# Patient Record
Sex: MALE | Race: WHITE | Employment: OTHER | URBAN - METROPOLITAN AREA
[De-identification: names, ages, dates, MRNs, and addresses within clinical notes are randomized per-mention and may not be internally consistent; named-entity substitution may affect disease eponyms.]

---

## 2019-12-26 ENCOUNTER — HOSPITAL ENCOUNTER (INPATIENT)
Age: 69
LOS: 5 days | Discharge: HOME | DRG: 872 | End: 2019-12-31
Attending: EMERGENCY MEDICINE | Admitting: INTERNAL MEDICINE
Payer: MEDICARE

## 2019-12-26 ENCOUNTER — APPOINTMENT (OUTPATIENT)
Dept: GENERAL RADIOLOGY | Age: 69
DRG: 872 | End: 2019-12-26
Payer: MEDICARE

## 2019-12-26 PROBLEM — A41.9 SEPSIS (HCC): Status: ACTIVE | Noted: 2019-12-26

## 2019-12-26 LAB
ALBUMIN SERPL-MCNC: 3.6 G/DL (ref 3.5–5.2)
ALP BLD-CCNC: 143 U/L (ref 40–129)
ALT SERPL-CCNC: 19 U/L (ref 0–40)
ANION GAP SERPL CALCULATED.3IONS-SCNC: 19 MMOL/L (ref 7–16)
AST SERPL-CCNC: 25 U/L (ref 0–39)
BACTERIA: ABNORMAL /HPF
BASOPHILS ABSOLUTE: 0 E9/L (ref 0–0.2)
BASOPHILS RELATIVE PERCENT: 0.1 % (ref 0–2)
BILIRUB SERPL-MCNC: 1.7 MG/DL (ref 0–1.2)
BILIRUBIN URINE: NEGATIVE
BLOOD, URINE: ABNORMAL
BUN BLDV-MCNC: 26 MG/DL (ref 8–23)
BURR CELLS: ABNORMAL
CALCIUM SERPL-MCNC: 9 MG/DL (ref 8.6–10.2)
CHLORIDE BLD-SCNC: 98 MMOL/L (ref 98–107)
CLARITY: ABNORMAL
CO2: 19 MMOL/L (ref 22–29)
COLOR: YELLOW
CREAT SERPL-MCNC: 1.8 MG/DL (ref 0.7–1.2)
EOSINOPHILS ABSOLUTE: 0 E9/L (ref 0.05–0.5)
EOSINOPHILS RELATIVE PERCENT: 0 % (ref 0–6)
GFR AFRICAN AMERICAN: 45
GFR NON-AFRICAN AMERICAN: 37 ML/MIN/1.73
GLUCOSE BLD-MCNC: 122 MG/DL (ref 74–99)
GLUCOSE URINE: NEGATIVE MG/DL
HCT VFR BLD CALC: 50.9 % (ref 37–54)
HEMOGLOBIN: 16.7 G/DL (ref 12.5–16.5)
INFLUENZA A BY PCR: NOT DETECTED
INFLUENZA B BY PCR: NOT DETECTED
KETONES, URINE: ABNORMAL MG/DL
LACTIC ACID: 4.4 MMOL/L (ref 0.5–2.2)
LEUKOCYTE ESTERASE, URINE: ABNORMAL
LYMPHOCYTES ABSOLUTE: 0.12 E9/L (ref 1.5–4)
LYMPHOCYTES RELATIVE PERCENT: 0.9 % (ref 20–42)
MAGNESIUM: 1.5 MG/DL (ref 1.6–2.6)
MCH RBC QN AUTO: 28.9 PG (ref 26–35)
MCHC RBC AUTO-ENTMCNC: 32.8 % (ref 32–34.5)
MCV RBC AUTO: 88.1 FL (ref 80–99.9)
MONOCYTES ABSOLUTE: 0.12 E9/L (ref 0.1–0.95)
MONOCYTES RELATIVE PERCENT: 0.9 % (ref 2–12)
NEUTROPHILS ABSOLUTE: 11.76 E9/L (ref 1.8–7.3)
NEUTROPHILS RELATIVE PERCENT: 98.3 % (ref 43–80)
NITRITE, URINE: POSITIVE
NUCLEATED RED BLOOD CELLS: 0.9 /100 WBC
PDW BLD-RTO: 13.5 FL (ref 11.5–15)
PH UA: 5.5 (ref 5–9)
PLATELET # BLD: 156 E9/L (ref 130–450)
PMV BLD AUTO: 11.4 FL (ref 7–12)
POIKILOCYTES: ABNORMAL
POTASSIUM REFLEX MAGNESIUM: 3.4 MMOL/L (ref 3.5–5)
PROTEIN UA: 100 MG/DL
RBC # BLD: 5.78 E12/L (ref 3.8–5.8)
RBC UA: ABNORMAL /HPF (ref 0–2)
SODIUM BLD-SCNC: 136 MMOL/L (ref 132–146)
SPECIFIC GRAVITY UA: >=1.03 (ref 1–1.03)
TOTAL PROTEIN: 7.3 G/DL (ref 6.4–8.3)
TROPONIN: 0.04 NG/ML (ref 0–0.03)
UROBILINOGEN, URINE: 1 E.U./DL
WBC # BLD: 12 E9/L (ref 4.5–11.5)
WBC UA: ABNORMAL /HPF (ref 0–5)

## 2019-12-26 PROCEDURE — 6370000000 HC RX 637 (ALT 250 FOR IP): Performed by: STUDENT IN AN ORGANIZED HEALTH CARE EDUCATION/TRAINING PROGRAM

## 2019-12-26 PROCEDURE — 87150 DNA/RNA AMPLIFIED PROBE: CPT

## 2019-12-26 PROCEDURE — 93005 ELECTROCARDIOGRAM TRACING: CPT | Performed by: STUDENT IN AN ORGANIZED HEALTH CARE EDUCATION/TRAINING PROGRAM

## 2019-12-26 PROCEDURE — 87088 URINE BACTERIA CULTURE: CPT

## 2019-12-26 PROCEDURE — 84484 ASSAY OF TROPONIN QUANT: CPT

## 2019-12-26 PROCEDURE — 83605 ASSAY OF LACTIC ACID: CPT

## 2019-12-26 PROCEDURE — 83735 ASSAY OF MAGNESIUM: CPT

## 2019-12-26 PROCEDURE — 2060000000 HC ICU INTERMEDIATE R&B

## 2019-12-26 PROCEDURE — 36415 COLL VENOUS BLD VENIPUNCTURE: CPT

## 2019-12-26 PROCEDURE — 87186 SC STD MICRODIL/AGAR DIL: CPT

## 2019-12-26 PROCEDURE — 99285 EMERGENCY DEPT VISIT HI MDM: CPT

## 2019-12-26 PROCEDURE — 80053 COMPREHEN METABOLIC PANEL: CPT

## 2019-12-26 PROCEDURE — 71045 X-RAY EXAM CHEST 1 VIEW: CPT

## 2019-12-26 PROCEDURE — 81001 URINALYSIS AUTO W/SCOPE: CPT

## 2019-12-26 PROCEDURE — 87077 CULTURE AEROBIC IDENTIFY: CPT

## 2019-12-26 PROCEDURE — 87040 BLOOD CULTURE FOR BACTERIA: CPT

## 2019-12-26 PROCEDURE — 85025 COMPLETE CBC W/AUTO DIFF WBC: CPT

## 2019-12-26 PROCEDURE — 87502 INFLUENZA DNA AMP PROBE: CPT

## 2019-12-26 PROCEDURE — 2580000003 HC RX 258: Performed by: STUDENT IN AN ORGANIZED HEALTH CARE EDUCATION/TRAINING PROGRAM

## 2019-12-26 RX ORDER — MAGNESIUM SULFATE 1 G/100ML
1 INJECTION INTRAVENOUS ONCE
Status: COMPLETED | OUTPATIENT
Start: 2019-12-27 | End: 2019-12-27

## 2019-12-26 RX ORDER — 0.9 % SODIUM CHLORIDE 0.9 %
30 INTRAVENOUS SOLUTION INTRAVENOUS ONCE
Status: DISCONTINUED | OUTPATIENT
Start: 2019-12-26 | End: 2019-12-26

## 2019-12-26 RX ORDER — POTASSIUM CHLORIDE 20 MEQ/1
40 TABLET, EXTENDED RELEASE ORAL ONCE
Status: COMPLETED | OUTPATIENT
Start: 2019-12-26 | End: 2019-12-27

## 2019-12-26 RX ORDER — SODIUM CHLORIDE, SODIUM LACTATE, POTASSIUM CHLORIDE, AND CALCIUM CHLORIDE .6; .31; .03; .02 G/100ML; G/100ML; G/100ML; G/100ML
30 INJECTION, SOLUTION INTRAVENOUS ONCE
Status: DISCONTINUED | OUTPATIENT
Start: 2019-12-26 | End: 2019-12-26

## 2019-12-26 RX ORDER — 0.9 % SODIUM CHLORIDE 0.9 %
30 INTRAVENOUS SOLUTION INTRAVENOUS ONCE
Status: COMPLETED | OUTPATIENT
Start: 2019-12-26 | End: 2019-12-27

## 2019-12-26 RX ORDER — MAGNESIUM SULFATE HEPTAHYDRATE 500 MG/ML
1 INJECTION, SOLUTION INTRAMUSCULAR; INTRAVENOUS ONCE
Status: DISCONTINUED | OUTPATIENT
Start: 2019-12-26 | End: 2019-12-26 | Stop reason: CLARIF

## 2019-12-26 RX ORDER — ACETAMINOPHEN 325 MG/1
650 TABLET ORAL ONCE
Status: COMPLETED | OUTPATIENT
Start: 2019-12-26 | End: 2019-12-26

## 2019-12-26 RX ADMIN — SODIUM CHLORIDE 3078 ML: 9 INJECTION, SOLUTION INTRAVENOUS at 21:58

## 2019-12-26 RX ADMIN — ACETAMINOPHEN 650 MG: 325 TABLET, FILM COATED ORAL at 22:06

## 2019-12-26 ASSESSMENT — ENCOUNTER SYMPTOMS
ABDOMINAL DISTENTION: 0
RECTAL PAIN: 0
CONSTIPATION: 0
TROUBLE SWALLOWING: 0
ALLERGIC/IMMUNOLOGIC NEGATIVE: 1
STRIDOR: 0
PHOTOPHOBIA: 0
CHEST TIGHTNESS: 0
NAUSEA: 0
SHORTNESS OF BREATH: 1
DIARRHEA: 0
VOMITING: 0
COUGH: 0
COLOR CHANGE: 0
WHEEZING: 0
CHOKING: 0
VOICE CHANGE: 0

## 2019-12-26 ASSESSMENT — PAIN SCALES - GENERAL: PAINLEVEL_OUTOF10: 0

## 2019-12-27 ENCOUNTER — APPOINTMENT (OUTPATIENT)
Dept: ULTRASOUND IMAGING | Age: 69
DRG: 872 | End: 2019-12-27
Payer: MEDICARE

## 2019-12-27 LAB
ACINETOBACTER BAUMANNII BY PCR: NOT DETECTED
ALBUMIN SERPL-MCNC: 2.8 G/DL (ref 3.5–5.2)
ALP BLD-CCNC: 65 U/L (ref 40–129)
ALT SERPL-CCNC: 22 U/L (ref 0–40)
ANION GAP SERPL CALCULATED.3IONS-SCNC: 14 MMOL/L (ref 7–16)
AST SERPL-CCNC: 38 U/L (ref 0–39)
BILIRUB SERPL-MCNC: 1.1 MG/DL (ref 0–1.2)
BUN BLDV-MCNC: 28 MG/DL (ref 8–23)
CALCIUM SERPL-MCNC: 7.9 MG/DL (ref 8.6–10.2)
CANDIDA ALBICANS BY PCR: NOT DETECTED
CANDIDA GLABRATA BY PCR: NOT DETECTED
CANDIDA KRUSEI BY PCR: NOT DETECTED
CANDIDA PARAPSILOSIS BY PCR: NOT DETECTED
CANDIDA TROPICALIS BY PCR: NOT DETECTED
CARBAPENEM RESISTANCE KPC BY PCR: NOT DETECTED
CHLORIDE BLD-SCNC: 104 MMOL/L (ref 98–107)
CO2: 18 MMOL/L (ref 22–29)
CREAT SERPL-MCNC: 1.7 MG/DL (ref 0.7–1.2)
EKG ATRIAL RATE: 130 BPM
EKG P AXIS: 49 DEGREES
EKG P-R INTERVAL: 148 MS
EKG Q-T INTERVAL: 302 MS
EKG QRS DURATION: 88 MS
EKG QTC CALCULATION (BAZETT): 444 MS
EKG R AXIS: -48 DEGREES
EKG T AXIS: 71 DEGREES
EKG VENTRICULAR RATE: 130 BPM
ENTEROBACTER CLOACAE COMPLEX BY PCR: NOT DETECTED
ENTEROBACTERALES BY PCR: DETECTED
ENTEROCOCCUS BY PCR: NOT DETECTED
ESCHERICHIA COLI BY PCR: DETECTED
GFR AFRICAN AMERICAN: 48
GFR NON-AFRICAN AMERICAN: 40 ML/MIN/1.73
GLUCOSE BLD-MCNC: 127 MG/DL (ref 74–99)
HAEMOPHILUS INFLUENZAE BY PCR: NOT DETECTED
HCT VFR BLD CALC: 44 % (ref 37–54)
HEMOGLOBIN: 14.6 G/DL (ref 12.5–16.5)
KLEBSIELLA OXYTOCA BY PCR: NOT DETECTED
KLEBSIELLA PNEUMONIAE GROUP BY PCR: NOT DETECTED
LACTIC ACID: 2 MMOL/L (ref 0.5–2.2)
LACTIC ACID: 3 MMOL/L (ref 0.5–2.2)
LISTERIA MONOCYTOGENES BY PCR: NOT DETECTED
MAGNESIUM: 1.8 MG/DL (ref 1.6–2.6)
MCH RBC QN AUTO: 29.6 PG (ref 26–35)
MCHC RBC AUTO-ENTMCNC: 33.2 % (ref 32–34.5)
MCV RBC AUTO: 89.2 FL (ref 80–99.9)
NEISSERIA MENINGITIDIS BY PCR: NOT DETECTED
PDW BLD-RTO: 13.7 FL (ref 11.5–15)
PLATELET # BLD: 133 E9/L (ref 130–450)
PMV BLD AUTO: 11.9 FL (ref 7–12)
POTASSIUM REFLEX MAGNESIUM: 3.5 MMOL/L (ref 3.5–5)
PROCALCITONIN: 29.32 NG/ML (ref 0–0.08)
PROTEUS BY PCR: NOT DETECTED
PSEUDOMONAS AERUGINOSA BY PCR: NOT DETECTED
RBC # BLD: 4.93 E12/L (ref 3.8–5.8)
SERRATIA MARCESCENS BY PCR: NOT DETECTED
SODIUM BLD-SCNC: 136 MMOL/L (ref 132–146)
STAPHYLOCOCCUS AUREUS BY PCR: NOT DETECTED
STAPHYLOCOCCUS SPECIES BY PCR: NOT DETECTED
STREPTOCOCCUS AGALACTIAE BY PCR: NOT DETECTED
STREPTOCOCCUS PNEUMONIAE BY PCR: NOT DETECTED
STREPTOCOCCUS PYOGENES  BY PCR: NOT DETECTED
STREPTOCOCCUS SPECIES BY PCR: NOT DETECTED
TOTAL PROTEIN: 5.4 G/DL (ref 6.4–8.3)
WBC # BLD: 30.5 E9/L (ref 4.5–11.5)

## 2019-12-27 PROCEDURE — 2060000000 HC ICU INTERMEDIATE R&B

## 2019-12-27 PROCEDURE — 6360000002 HC RX W HCPCS: Performed by: INTERNAL MEDICINE

## 2019-12-27 PROCEDURE — 80053 COMPREHEN METABOLIC PANEL: CPT

## 2019-12-27 PROCEDURE — 2580000003 HC RX 258: Performed by: STUDENT IN AN ORGANIZED HEALTH CARE EDUCATION/TRAINING PROGRAM

## 2019-12-27 PROCEDURE — 6370000000 HC RX 637 (ALT 250 FOR IP): Performed by: EMERGENCY MEDICINE

## 2019-12-27 PROCEDURE — 85027 COMPLETE CBC AUTOMATED: CPT

## 2019-12-27 PROCEDURE — 84145 PROCALCITONIN (PCT): CPT

## 2019-12-27 PROCEDURE — 36415 COLL VENOUS BLD VENIPUNCTURE: CPT

## 2019-12-27 PROCEDURE — 6360000002 HC RX W HCPCS: Performed by: STUDENT IN AN ORGANIZED HEALTH CARE EDUCATION/TRAINING PROGRAM

## 2019-12-27 PROCEDURE — 6360000002 HC RX W HCPCS: Performed by: EMERGENCY MEDICINE

## 2019-12-27 PROCEDURE — 93010 ELECTROCARDIOGRAM REPORT: CPT | Performed by: INTERNAL MEDICINE

## 2019-12-27 PROCEDURE — 6370000000 HC RX 637 (ALT 250 FOR IP): Performed by: INTERNAL MEDICINE

## 2019-12-27 PROCEDURE — 2580000003 HC RX 258: Performed by: INTERNAL MEDICINE

## 2019-12-27 PROCEDURE — 76770 US EXAM ABDO BACK WALL COMP: CPT

## 2019-12-27 PROCEDURE — 83735 ASSAY OF MAGNESIUM: CPT

## 2019-12-27 PROCEDURE — 83605 ASSAY OF LACTIC ACID: CPT

## 2019-12-27 RX ORDER — POTASSIUM CHLORIDE 750 MG/1
10 TABLET, EXTENDED RELEASE ORAL DAILY
Status: DISCONTINUED | OUTPATIENT
Start: 2019-12-27 | End: 2019-12-29

## 2019-12-27 RX ORDER — SODIUM CHLORIDE 9 MG/ML
INJECTION, SOLUTION INTRAVENOUS CONTINUOUS
Status: DISCONTINUED | OUTPATIENT
Start: 2019-12-27 | End: 2019-12-29

## 2019-12-27 RX ORDER — ERGOCALCIFEROL 1.25 MG/1
50000 CAPSULE ORAL WEEKLY
COMMUNITY
End: 2022-10-24 | Stop reason: SDUPTHER

## 2019-12-27 RX ORDER — ONDANSETRON 2 MG/ML
4 INJECTION INTRAMUSCULAR; INTRAVENOUS EVERY 6 HOURS PRN
Status: DISCONTINUED | OUTPATIENT
Start: 2019-12-27 | End: 2019-12-31 | Stop reason: HOSPADM

## 2019-12-27 RX ORDER — HEPARIN SODIUM 10000 [USP'U]/ML
5000 INJECTION, SOLUTION INTRAVENOUS; SUBCUTANEOUS EVERY 8 HOURS
Status: DISCONTINUED | OUTPATIENT
Start: 2019-12-27 | End: 2019-12-31 | Stop reason: HOSPADM

## 2019-12-27 RX ORDER — ACETAMINOPHEN 325 MG/1
650 TABLET ORAL EVERY 4 HOURS PRN
Status: DISCONTINUED | OUTPATIENT
Start: 2019-12-27 | End: 2019-12-31 | Stop reason: HOSPADM

## 2019-12-27 RX ORDER — MELOXICAM 7.5 MG/1
7.5 TABLET ORAL DAILY
Status: ON HOLD | COMMUNITY
End: 2019-12-30 | Stop reason: HOSPADM

## 2019-12-27 RX ADMIN — MAGNESIUM SULFATE 1 G: 1 INJECTION INTRAVENOUS at 01:57

## 2019-12-27 RX ADMIN — SODIUM CHLORIDE: 9 INJECTION, SOLUTION INTRAVENOUS at 01:57

## 2019-12-27 RX ADMIN — POTASSIUM CHLORIDE 40 MEQ: 1500 TABLET, EXTENDED RELEASE ORAL at 00:28

## 2019-12-27 RX ADMIN — HEPARIN SODIUM 5000 UNITS: 10000 INJECTION INTRAVENOUS; SUBCUTANEOUS at 14:36

## 2019-12-27 RX ADMIN — CEFEPIME HYDROCHLORIDE 2 G: 2 INJECTION, POWDER, FOR SOLUTION INTRAVENOUS at 14:36

## 2019-12-27 RX ADMIN — SODIUM CHLORIDE: 9 INJECTION, SOLUTION INTRAVENOUS at 21:55

## 2019-12-27 RX ADMIN — ACETAMINOPHEN 650 MG: 325 TABLET, FILM COATED ORAL at 18:14

## 2019-12-27 RX ADMIN — HEPARIN SODIUM 5000 UNITS: 10000 INJECTION INTRAVENOUS; SUBCUTANEOUS at 06:48

## 2019-12-27 RX ADMIN — HEPARIN SODIUM 5000 UNITS: 10000 INJECTION INTRAVENOUS; SUBCUTANEOUS at 21:37

## 2019-12-27 RX ADMIN — POTASSIUM CHLORIDE 10 MEQ: 750 TABLET, EXTENDED RELEASE ORAL at 07:55

## 2019-12-27 RX ADMIN — CEFTRIAXONE SODIUM 1 G: 1 INJECTION, POWDER, FOR SOLUTION INTRAMUSCULAR; INTRAVENOUS at 00:31

## 2019-12-27 ASSESSMENT — PAIN SCALES - GENERAL
PAINLEVEL_OUTOF10: 0
PAINLEVEL_OUTOF10: 0
PAINLEVEL_OUTOF10: 4
PAINLEVEL_OUTOF10: 0

## 2019-12-27 NOTE — PROGRESS NOTES
Hospitalist Progress Note      PCP: No primary care provider on file. Date of Admission: 12/26/2019    Chief Complaint: vomiting [facility] Course: 22-year-old male with fever and vomiting for a few days, has mild renal impairment, which seems to be chronic. Urine showed leukocytes. White blood count shot up to 30,000 today. Patient said that he feels much better, no nausea, and no abdominal pain. Subjective: Patient denies abdominal pain or nausea. Medications:  Reviewed    Infusion Medications    sodium chloride 100 mL/hr at 12/27/19 0157     Scheduled Medications    cefTRIAXone (ROCEPHIN) IV  1 g Intravenous Q24H    heparin (porcine)  5,000 Units Subcutaneous Q8H    potassium chloride  10 mEq Oral Daily     PRN Meds: acetaminophen, ondansetron      Intake/Output Summary (Last 24 hours) at 12/27/2019 1055  Last data filed at 12/27/2019 0533  Gross per 24 hour   Intake 670 ml   Output 0 ml   Net 670 ml       Exam:    /76   Pulse 103   Temp 99.1 °F (37.3 °C) (Temporal)   Resp 24   Ht 5' 10\" (1.778 m)   Wt 226 lb 3.2 oz (102.6 kg)   SpO2 94%   BMI 32.46 kg/m²     General appearance: No apparent distress, appears stated age and cooperative. HEENT: Pupils equal, round, and reactive to light. Conjunctivae/corneas clear. Neck: Supple, with full range of motion. No jugular venous distention. Trachea midline. Respiratory:  Normal respiratory effort. Clear to auscultation, bilaterally without Rales/Wheezes/Rhonchi. Cardiovascular: Regular rate and rhythm with normal S1/S2 without murmurs, rubs or gallops. Abdomen: Soft, non-tender, non-distended with normal bowel sounds. Musculoskeletal: No clubbing, cyanosis or edema bilaterally. Full range of motion without deformity. Skin: Skin color, texture, turgor normal.  No rashes or lesions. Neurologic:  Neurovascularly intact without any focal sensory/motor deficits.  Cranial nerves: II-XII intact, grossly

## 2019-12-27 NOTE — PROGRESS NOTES
Dr. Almendarez Early notified of consult via perfect serve.  Electronically signed by Chandu Pizano RN on 12/27/2019 at 8:53 AM

## 2019-12-27 NOTE — ED PROVIDER NOTES
75-year-old male no past medical history presents with a fever. Patient states that he has felt not like himself for the last 2 days. He attributes it to constipation and abdominal pain. States he has taken prune juice and had a bowl movement as of yesterday and feels much better. Although his abdominal pain has resolved patient has a fever. Patient admits to one bout of nonbloody vomitus today. Patient's reported temperature at home is 102.0. Patient has no other complaints. Denies chills, cough, sob, chest pain, abdominal pain, flank pain, dysuria, hematuria, diarrhea and constipation. Review of Systems   Constitutional: Positive for fever. Negative for activity change, chills, diaphoresis, fatigue and unexpected weight change. HENT: Negative for congestion, trouble swallowing and voice change. Eyes: Negative for photophobia and visual disturbance. Respiratory: Positive for shortness of breath. Negative for cough, choking, chest tightness, wheezing and stridor. Mildly short of breath   Cardiovascular: Negative. Negative for chest pain, palpitations and leg swelling. Gastrointestinal: Negative for abdominal distention, constipation, diarrhea, nausea, rectal pain and vomiting. Endocrine: Negative for polyphagia and polyuria. Genitourinary: Negative for dysuria and hematuria. Musculoskeletal: Negative. Skin: Positive for rash. Negative for color change, pallor and wound. Right scrotal rash   Allergic/Immunologic: Negative. Neurological: Negative. Negative for dizziness, tremors and weakness. Hematological: Negative. Psychiatric/Behavioral: Negative. Negative for agitation, sleep disturbance and suicidal ideas. Physical Exam  Constitutional:       General: He is not in acute distress. Appearance: Normal appearance. He is not ill-appearing or diaphoretic. HENT:      Head: Normocephalic and atraumatic.       Right Ear: Tympanic membrane normal. E9/L    Monocytes Absolute 0.12 0.10 - 0.95 E9/L    Eosinophils Absolute 0.00 (L) 0.05 - 0.50 E9/L    Basophils Absolute 0.00 0.00 - 0.20 E9/L    nRBC 0.9 /100 WBC    Poikilocytes 1+     [place] Cells 1+    Comprehensive Metabolic Panel w/ Reflex to MG   Result Value Ref Range    Sodium 136 132 - 146 mmol/L    Potassium reflex Magnesium 3.4 (L) 3.5 - 5.0 mmol/L    Chloride 98 98 - 107 mmol/L    CO2 19 (L) 22 - 29 mmol/L    Anion Gap 19 (H) 7 - 16 mmol/L    Glucose 122 (H) 74 - 99 mg/dL    BUN 26 (H) 8 - 23 mg/dL    CREATININE 1.8 (H) 0.7 - 1.2 mg/dL    GFR Non-African American 37 >=60 mL/min/1.73    GFR African American 45     Calcium 9.0 8.6 - 10.2 mg/dL    Total Protein 7.3 6.4 - 8.3 g/dL    Alb 3.6 3.5 - 5.2 g/dL    Total Bilirubin 1.7 (H) 0.0 - 1.2 mg/dL    Alkaline Phosphatase 143 (H) 40 - 129 U/L    ALT 19 0 - 40 U/L    AST 25 0 - 39 U/L   Troponin   Result Value Ref Range    Troponin 0.04 (H) 0.00 - 0.03 ng/mL   Lactic Acid, Plasma   Result Value Ref Range    Lactic Acid 4.4 (HH) 0.5 - 2.2 mmol/L   Lactic Acid, Plasma   Result Value Ref Range    Lactic Acid 3.0 (H) 0.5 - 2.2 mmol/L   Urinalysis, reflex to microscopic   Result Value Ref Range    Color, UA Yellow Straw/Yellow    Clarity, UA TURBID (A) Clear    Glucose, Ur Negative Negative mg/dL    Bilirubin Urine Negative Negative    Ketones, Urine TRACE (A) Negative mg/dL    Specific Gravity, UA >=1.030 1.005 - 1.030    Blood, Urine LARGE (A) Negative    pH, UA 5.5 5.0 - 9.0    Protein,  (A) Negative mg/dL    Urobilinogen, Urine 1.0 <2.0 E.U./dL    Nitrite, Urine POSITIVE (A) Negative    Leukocyte Esterase, Urine MODERATE (A) Negative   Microscopic Urinalysis   Result Value Ref Range    WBC, UA PACKED 0 - 5 /HPF    RBC, UA 2-5 0 - 2 /HPF    Bacteria, UA MANY (A) /HPF   Magnesium   Result Value Ref Range    Magnesium 1.5 (L) 1.6 - 2.6 mg/dL       RADIOLOGY:  XR CHEST PORTABLE   Final Result      Dilatation of the right hemidiaphragm with contiguous atelectasis. Slight atelectasis or scarring in the left lung base. EKG:  This EKG is signed and interpreted by me. Rate: 130  Rhythm: Sinus  Interpretation: sinus tachycardia, left axis deviation, no STEMI  Comparison: no previous EKG available      ------------------------- NURSING NOTES AND VITALS REVIEWED ---------------------------  Date / Time Roomed:  12/26/2019  9:35 PM  ED Bed Assignment:  [id number]    The nursing notes within the ED encounter and vital signs as below have been reviewed. Patient Vitals for the past 24 hrs:   BP Temp Temp src Pulse Resp SpO2 Height Weight   12/27/19 0116 111/66 99.7 °F (37.6 °C) Temporal 130 27 96 % -- --   12/27/19 0025 -- 98.1 °F (36.7 °C) -- -- -- -- -- --   12/27/19 0015 97/64 -- -- 127 (!) 42 94 % -- --   12/26/19 2257 102/60 -- -- 120 30 93 % -- --   12/26/19 2203 (!) 94/57 -- -- 122 14 92 % -- --   12/26/19 2148 -- -- -- -- -- -- -- 226 lb 3.2 oz (102.6 kg)   12/26/19 2147 112/67 103.1 °F (39.5 °C) Rectal 134 16 92 % 5' 10\" (1.778 m) 226 lb 2 oz (102.6 kg)       Oxygen Saturation Interpretation: Normal    ------------------------------------------ PROGRESS NOTES ------------------------------------------  Re-evaluation(s):  Time: 0015  Patients symptoms are improving  Repeat physical examination is improved    Counseling:  I have spoken with the patient and discussed todays results, in addition to providing specific details for the plan of care and counseling regarding the diagnosis and prognosis. Their questions are answered at this time and they are agreeable with the plan of admission.    --------------------------------- ADDITIONAL PROVIDER NOTES ---------------------------------  Consultations:  Spoke with Dr. Ulysses Skipper. Discussed case. They will admit the patient.   This patient's ED course included: a personal history and physicial examination, re-evaluation prior to disposition, multiple bedside re-evaluations, IV medications, cardiac monitoring and continuous pulse oximetry    This patient has remained hemodynamically stable during their ED course. Diagnosis:  1. Septicemia ([address]    2. Urinary tract infection without hematuria, site unspecified        Disposition:  Patient's disposition: Admit to telemetry  Patient's condition is fair.          Alejandro Denny MD  Resident  12/27/19 0959

## 2019-12-27 NOTE — ED NOTES
Blood cultures obtained from rt hand, per policy. Set two of two drawn at this time.                Isa Long RN  12/26/19 9934

## 2019-12-27 NOTE — ED NOTES
Bed: 11  Expected date:   Expected time:   Means of arrival:   Comments:  Luz Elena Gilliland RN  12/26/19 1333

## 2019-12-27 NOTE — ED NOTES
Lab called with Critical lab value of 4.4 Lactic - Dr. Richard Carlisle aware     Cyndi Tan, RN  12/26/19 1952

## 2019-12-27 NOTE — CARE COORDINATION
Spoke with patient and spouse at bedside. Patient from home, he is ambulatory with a cane. No oxygen in the home but currently wearing 3 liters. PCP is Dr. Murray Menomonie in [facility]. No history of homecare. Patient drives. Plan is home at discharge.

## 2019-12-27 NOTE — CONSULTS
Department of Internal Medicine  Infectious Diseases   Consult Note      Reason for Consult:  GNR sepsis, bacteremia       Requesting Physician:  Dr Mariajose Hooper:              This is a 68 yrs old male presented to the ER with one day hx of fever and chills , and 2 days hx of left lower abdomen pain . He reported that thew up after dinner in [place] day - then ,he developed left lower abdomen pain , which moved to the center . He denies hx of kidney stones or prostate problem , denies dysuria,or flank pain . He had temp of 103 F ,  WBC 30 K, lactic acid 4.4 , procalcitonin 29.32   Urine  Cloudy , blood cx growing GNR   He was given Ceftriaxone . Past Medical History:      Arthritis   Post polio syndrome     Past Surgical History:      Reconstructive leg surgery for polio     Current Medications:      Current Facility-Administered Medications   Medication Dose Route Frequency Provider Last Rate Last Dose    cefTRIAXone (ROCEPHIN) 1 g in sterile water 10 mL IV syringe  1 g Intravenous Q24H Donalynn Guardian, DO        acetaminophen (TYLENOL) tablet 650 mg  650 mg Oral Q4H PRN Donalynn Guardian, DO        heparin (porcine) injection 5,000 Units  5,000 Units Subcutaneous Q8H Donalynn Guardian, DO   5,000 Units at 12/27/19 3158    0.9 % sodium chloride infusion   Intravenous Continuous Donalynn Guardian,  mL/hr at 12/27/19 0157      potassium chloride (KLOR-CON M) extended release tablet 10 mEq  10 mEq Oral Daily Donalynn Guardian, DO   10 mEq at 12/27/19 0755    ondansetron (ZOFRAN) injection 4 mg  4 mg Intravenous Q6H PRN Donalynn Guardian, DO           Allergies:  Patient has no known allergies.     Social History:    Tobacco  Alcohol    Family History:     Grand mother - DM     REVIEW OF SYSTEMS:    CONSTITUTIONAL:  Fever and chills   HEENT: denies blurring of vision or double vision, denies hearing problem  RESPIRATORY: denies cough, shortness of breath, sputum expectoration, chest pain.  CARDIOVASCULAR:  Denies palpitation  GASTROINTESTINAL:  Abdomen pain, vomiting   GENITOURINARY:  Denies burning urination or frequency of urination  INTEGUMENT: denies wound , rash  HEMATOLOGIC/LYMPHATIC:  Denies lymph node swelling, gum bleeding or easy bruising. MUSCULOSKELETAL:  Denies leg pain , joint pain , joint swelling  NEUROLOGICAL:  Denies light headed, dizziness, loss of consciousness, weakness of lower extremities, bowel or bladder incontinence. PHYSICAL EXAM:      Vitals:     Vitals:    12/27/19 0755   BP: 121/76   Pulse: 103   Resp: 24   Temp: 99.1 °F (37.3 °C)   SpO2: 94%       General Appearance:    Awake, alert , no acute distress. Head:    Normocephalic, atraumatic   Eyes:    No pallor, no icterus,   Ears:    No obvious deformity or drainage.    Nose:   No nasal drainage   Throat:   Mucosa moist, no oral thrush   Neck:   Supple, no lymphadenopathy   Back:     no CVA tenderness   Lungs:     Clear to auscultation bilaterally, no wheeze    Heart:    Tachycardia    Abdomen:     Soft, non-tender, bowel sounds present    Extremities:    Edema +.non tender, no erythema    Pulses:   Dorsalis pedis palpable    Skin:   no rashes or lesions         CBC with Differential:      Lab Results   Component Value Date    WBC 30.5 12/27/2019    RBC 4.93 12/27/2019    HGB 14.6 12/27/2019    HCT 44.0 12/27/2019     12/27/2019    MCV 89.2 12/27/2019    MCH 29.6 12/27/2019    MCHC 33.2 12/27/2019    RDW 13.7 12/27/2019    NRBC 0.9 12/26/2019    LYMPHOPCT 0.9 12/26/2019    MONOPCT 0.9 12/26/2019    BASOPCT 0.1 12/26/2019    MONOSABS 0.12 12/26/2019    LYMPHSABS 0.12 12/26/2019    EOSABS 0.00 12/26/2019    BASOSABS 0.00 12/26/2019       CMP     Lab Results   Component Value Date     12/27/2019    K 3.5 12/27/2019     12/27/2019    CO2 18 12/27/2019    BUN 28 12/27/2019    CREATININE 1.7 12/27/2019    GFRAA 48 12/27/2019    LABGLOM 40 12/27/2019    GLUCOSE 127 12/27/2019    GLUCOSE 93 08/29/2011    PROT 5.4 12/27/2019    LABALBU 2.8 12/27/2019    LABALBU 4.2 08/29/2011    CALCIUM 7.9 12/27/2019    BILITOT 1.1 12/27/2019    ALKPHOS 65 12/27/2019    AST 38 12/27/2019    ALT 22 12/27/2019         Hepatic Function Panel:    Lab Results   Component Value Date    ALKPHOS 65 12/27/2019    ALT 22 12/27/2019    AST 38 12/27/2019    PROT 5.4 12/27/2019    BILITOT 1.1 12/27/2019    LABALBU 2.8 12/27/2019    LABALBU 4.2 08/29/2011       PT/INR:  No results found for: PROTIME, INR    TSH:  No results found for: TSH    U/A:    Lab Results   Component Value Date    COLORU Yellow 12/26/2019    PHUR 5.5 12/26/2019    WBCUA PACKED 12/26/2019    RBCUA 2-5 12/26/2019    BACTERIA MANY 12/26/2019    CLARITYU TURBID 12/26/2019    SPECGRAV >=1.030 12/26/2019    LEUKOCYTESUR MODERATE 12/26/2019    UROBILINOGEN 1.0 12/26/2019    BILIRUBINUR Negative 12/26/2019    BLOODU LARGE 12/26/2019    GLUCOSEU Negative 12/26/2019       ABG:  No results found for: GBF7XIT, BEART, W8TBFALH, PHART, THGBART, XTZ3HAC, PO2ART, HKB2GZQ    MICROBIOLOGY:    Blood culture - GNR     Urine Culture - pending     Radiology :    Chest X ray - no infiltrates         IMPRESSION:     1. GNR sepsis, bacteremia  2. Complicated UTI  3. Fever, Leukocytosis, lactic acidosis, elevated procalcitonin level sec to sepsis         RECOMMENDATIONS:      1. Stop rocephin   2. Cefepime 2 grams IV q 12 hrs   3. Await blood and urine cx   4. CBC with diff   5.  Ultrasound of kidneys     Thank you Dr Haris Mendiola for the consult

## 2019-12-27 NOTE — CONSULTS
lungs, mediastinum and regional skeleton are otherwise unremarkable. Dilatation of the right hemidiaphragm with contiguous atelectasis. Slight atelectasis or scarring in the left lung base. Assessment  1. Chronic kidney disease stage III etiology not entirely clear. He does have a longstanding use of NSAIDs for \"arthritis\". This may be a contributing factor to his CKD  2. UTI  3. Dehydration  4. Mild hypokalemia    Plan  1. Check urine indices  2. IV fluids  3. Bilateral renal ultrasound for completeness  4. Monitor labs  5.  Antibiotics      Will follow patient with you  Thanks    Connor Salas MD  11:54 AM  12/27/2019

## 2019-12-27 NOTE — H&P
Hospital Medicine History & Physical      PCP: No primary care provider on file. Date of Admission: 12/26/2019    Date of Service: Pt seen/examined on 12/27and Admitted to Inpatient with expected LOS greater than two midnights due to medical therapy. Chief Complaint:  Vomiting and fever      History Of Present Illness:    68 y.o. male who presented to [address] [place] [address] with vomiting and fever. He said he has a medical history of arthritis, he is only on vitamins and Mobic at home. At any rate for the past 2 to 3 days he has been nauseous, vomiting, but has had no diarrhea. He has no dysuria either, really no frequency or burning on urination. He describes his urine is dark. Influenza testing negative, urine did show significant pyuria. He denies chest pain or palpitations, he denies shortness of breath or cough. No blood in the vomitus    Past Medical History:      History reviewed. No pertinent past medical history. Past Surgical History:      History reviewed. No pertinent surgical history. Medications Prior to Admission:      Prior to Admission medications    Not on File       Allergies:  Patient has no known allergies. Social History:      The patient currently lives with his significant other in [address] [place] [address]:   reports that he has never smoked. He has never used smokeless tobacco.  ETOH:   reports current alcohol use. Family History:      Reviewed in detail and negative for DM, CAD, Cancer, CVA. Positive as follows:    History reviewed. No pertinent family history. REVIEW OF SYSTEMS:   Pertinent positives as noted in the HPI. All other systems reviewed and negative. PHYSICAL EXAM:    BP 97/64   Pulse 127   Temp 98.1 °F (36.7 °C)   Resp (!) 42   Ht 5' 10\" (1.778 m)   Wt 226 lb 3.2 oz (102.6 kg)   SpO2 94%   BMI 32.46 kg/m²     General appearance:  No apparent distress, appears stated age and cooperative.   HEENT:  Normal cephalic, atraumatic without guidance    DVT Prophylaxis: Heparin due to mild renal impairment  Diet: No diet orders on file  Code Status: No Order    PT/OT Eval Status: Pending    Dispo -goal home       Margareth White DO    Thank you No primary care provider on file. for the opportunity to be involved in this patient's care. If you have any questions or concerns please feel free to contact me at [phone].

## 2019-12-28 LAB
ALBUMIN SERPL-MCNC: 2.8 G/DL (ref 3.5–5.2)
ALP BLD-CCNC: 67 U/L (ref 40–129)
ALT SERPL-CCNC: 21 U/L (ref 0–40)
ANION GAP SERPL CALCULATED.3IONS-SCNC: 10 MMOL/L (ref 7–16)
AST SERPL-CCNC: 38 U/L (ref 0–39)
BILIRUB SERPL-MCNC: 0.6 MG/DL (ref 0–1.2)
BUN BLDV-MCNC: 26 MG/DL (ref 8–23)
CALCIUM SERPL-MCNC: 8.2 MG/DL (ref 8.6–10.2)
CHLORIDE BLD-SCNC: 107 MMOL/L (ref 98–107)
CO2: 21 MMOL/L (ref 22–29)
CREAT SERPL-MCNC: 1.2 MG/DL (ref 0.7–1.2)
GFR AFRICAN AMERICAN: >60
GFR NON-AFRICAN AMERICAN: 60 ML/MIN/1.73
GLUCOSE BLD-MCNC: 104 MG/DL (ref 74–99)
HCT VFR BLD CALC: 43.6 % (ref 37–54)
HEMOGLOBIN: 14.3 G/DL (ref 12.5–16.5)
MCH RBC QN AUTO: 29.3 PG (ref 26–35)
MCHC RBC AUTO-ENTMCNC: 32.8 % (ref 32–34.5)
MCV RBC AUTO: 89.3 FL (ref 80–99.9)
PDW BLD-RTO: 14 FL (ref 11.5–15)
PLATELET # BLD: 121 E9/L (ref 130–450)
PMV BLD AUTO: 12.2 FL (ref 7–12)
POTASSIUM REFLEX MAGNESIUM: 4 MMOL/L (ref 3.5–5)
RBC # BLD: 4.88 E12/L (ref 3.8–5.8)
SODIUM BLD-SCNC: 138 MMOL/L (ref 132–146)
TOTAL PROTEIN: 6 G/DL (ref 6.4–8.3)
WBC # BLD: 17.5 E9/L (ref 4.5–11.5)

## 2019-12-28 PROCEDURE — 6360000002 HC RX W HCPCS: Performed by: INTERNAL MEDICINE

## 2019-12-28 PROCEDURE — 36415 COLL VENOUS BLD VENIPUNCTURE: CPT

## 2019-12-28 PROCEDURE — 80053 COMPREHEN METABOLIC PANEL: CPT

## 2019-12-28 PROCEDURE — 2580000003 HC RX 258: Performed by: INTERNAL MEDICINE

## 2019-12-28 PROCEDURE — 2060000000 HC ICU INTERMEDIATE R&B

## 2019-12-28 PROCEDURE — 6370000000 HC RX 637 (ALT 250 FOR IP): Performed by: FAMILY MEDICINE

## 2019-12-28 PROCEDURE — 85027 COMPLETE CBC AUTOMATED: CPT

## 2019-12-28 PROCEDURE — 6370000000 HC RX 637 (ALT 250 FOR IP): Performed by: INTERNAL MEDICINE

## 2019-12-28 RX ORDER — LANOLIN ALCOHOL/MO/W.PET/CERES
3 CREAM (GRAM) TOPICAL NIGHTLY PRN
Status: DISCONTINUED | OUTPATIENT
Start: 2019-12-28 | End: 2019-12-31 | Stop reason: HOSPADM

## 2019-12-28 RX ORDER — HYDROCODONE BITARTRATE AND ACETAMINOPHEN 5; 325 MG/1; MG/1
1 TABLET ORAL EVERY 6 HOURS PRN
Status: DISCONTINUED | OUTPATIENT
Start: 2019-12-28 | End: 2019-12-31 | Stop reason: HOSPADM

## 2019-12-28 RX ADMIN — SODIUM CHLORIDE: 9 INJECTION, SOLUTION INTRAVENOUS at 09:33

## 2019-12-28 RX ADMIN — ACETAMINOPHEN 650 MG: 325 TABLET, FILM COATED ORAL at 04:31

## 2019-12-28 RX ADMIN — SODIUM CHLORIDE: 9 INJECTION, SOLUTION INTRAVENOUS at 20:22

## 2019-12-28 RX ADMIN — CEFTRIAXONE SODIUM 1 G: 1 INJECTION, POWDER, FOR SOLUTION INTRAMUSCULAR; INTRAVENOUS at 15:05

## 2019-12-28 RX ADMIN — POTASSIUM CHLORIDE 10 MEQ: 750 TABLET, EXTENDED RELEASE ORAL at 07:54

## 2019-12-28 RX ADMIN — MELATONIN 3 MG ORAL TABLET 3 MG: 3 TABLET ORAL at 02:10

## 2019-12-28 RX ADMIN — HEPARIN SODIUM 5000 UNITS: 10000 INJECTION INTRAVENOUS; SUBCUTANEOUS at 05:34

## 2019-12-28 RX ADMIN — CEFEPIME HYDROCHLORIDE 2 G: 2 INJECTION, POWDER, FOR SOLUTION INTRAVENOUS at 02:11

## 2019-12-28 RX ADMIN — HEPARIN SODIUM 5000 UNITS: 10000 INJECTION INTRAVENOUS; SUBCUTANEOUS at 21:03

## 2019-12-28 RX ADMIN — HEPARIN SODIUM 5000 UNITS: 10000 INJECTION INTRAVENOUS; SUBCUTANEOUS at 14:15

## 2019-12-28 RX ADMIN — HYDROCODONE BITARTRATE AND ACETAMINOPHEN 1 TABLET: 5; 325 TABLET ORAL at 14:20

## 2019-12-28 ASSESSMENT — PAIN DESCRIPTION - FREQUENCY: FREQUENCY: INTERMITTENT

## 2019-12-28 ASSESSMENT — PAIN SCALES - GENERAL
PAINLEVEL_OUTOF10: 5
PAINLEVEL_OUTOF10: 8
PAINLEVEL_OUTOF10: 8

## 2019-12-28 ASSESSMENT — PAIN DESCRIPTION - PAIN TYPE: TYPE: CHRONIC PAIN

## 2019-12-28 ASSESSMENT — PAIN DESCRIPTION - PROGRESSION: CLINICAL_PROGRESSION: GRADUALLY WORSENING

## 2019-12-28 ASSESSMENT — PAIN DESCRIPTION - LOCATION: LOCATION: ARM;SHOULDER

## 2019-12-28 ASSESSMENT — PAIN DESCRIPTION - ONSET: ONSET: ON-GOING

## 2019-12-28 ASSESSMENT — PAIN DESCRIPTION - ORIENTATION: ORIENTATION: LEFT

## 2019-12-28 NOTE — PROGRESS NOTES
Nephrology Progress Note  Patient's Name: Betzy Thomas  5:37 PM  12/28/2019        Reason for Consult: Acute kidney injury  Requesting Physician:  No primary care provider on file. Chief Complaint: Chills  History Obtained From:  Patient    History of Present Ilness:    Betzy Thomas is a 68 y.o. male with no significant past medical history. Patient presents to hospital with complaints of abdominal pain associated with constipation which started several days ago. He did take a laxative concoction which included prune juice and warm water. This led to diarrhea which was self-limited. A day or so later he began to experience some nausea associated with emesis and chills. This prompted his ED visit. On presentation his initial vitals showed a blood pressure of 94/57 temperature 99.7 and a pulse of 130. Laboratory data was significant for mild leukocytosis of 12,000, hemoglobin 16.7, hematocrit 50.9 and a platelet count of 872,346. Urinalysis showed positive nitrite moderate leukocyte esterase. Lactic acid was 3. Jennifer Sas profile showed a sodium of 136, potassium 3.4, CO2 19, BUN 26 and a creatinine 1. 8(previous lab data showed a creatinine of 1.6 in August 2011). Patient was started on IV fluids and admitted to telemetry for further management.     Subjective    12/28: says appetite is sightly more improved; but that he is only eating 50% of whats on his tray    Current Medications:    melatonin tablet 3 mg, Nightly PRN  HYDROcodone-acetaminophen (NORCO) 5-325 MG per tablet 1 tablet, Q6H PRN  cefTRIAXone (ROCEPHIN) 1 g in sterile water 10 mL IV syringe, Q24H  acetaminophen (TYLENOL) tablet 650 mg, Q4H PRN  heparin (porcine) injection 5,000 Units, Q8H  0.9 % sodium chloride infusion, Continuous  potassium chloride (KLOR-CON M) extended release tablet 10 mEq, Daily  ondansetron (ZOFRAN) injection 4 mg, Q6H PRN        Physical exam:  Vitals:    12/28/19 1507   BP: (!) 168/95   Pulse: 94   Resp: 18   Temp:

## 2019-12-28 NOTE — PROGRESS NOTES
Dr. Heaven Haque notified of patient's Living Will and wishes to be DNR-CCA. Patient also requesting additional pain medication for shoulder pain.  Electronically signed by Beba Jose RN on 12/28/2019 at 11:13 AM

## 2019-12-28 NOTE — PROGRESS NOTES
(!) 137/97   Pulse: 97   Resp: 18   Temp: 98.4 °F (36.9 °C)   SpO2: 97%       General Appearance:    Awake, alert , no acute distress. Head:    Normocephalic, atraumatic   Eyes:    No pallor, no icterus,   Ears:    No obvious deformity or drainage.    Nose:   No nasal drainage   Throat:   Mucosa moist, no oral thrush   Neck:   Supple, no lymphadenopathy   Back:     no CVA tenderness   Lungs:     Clear to auscultation bilaterally, no wheeze    Heart:    Regular , no murmur    Abdomen:     Soft, non-tender, bowel sounds present    Extremities:    Edema +.non tender, no erythema    Pulses:   Dorsalis pedis palpable    Skin:   no rashes or lesions         CBC with Differential:      Lab Results   Component Value Date    WBC 17.5 12/28/2019    RBC 4.88 12/28/2019    HGB 14.3 12/28/2019    HCT 43.6 12/28/2019     12/28/2019    MCV 89.3 12/28/2019    MCH 29.3 12/28/2019    MCHC 32.8 12/28/2019    RDW 14.0 12/28/2019    NRBC 0.9 12/26/2019    LYMPHOPCT 0.9 12/26/2019    MONOPCT 0.9 12/26/2019    BASOPCT 0.1 12/26/2019    MONOSABS 0.12 12/26/2019    LYMPHSABS 0.12 12/26/2019    EOSABS 0.00 12/26/2019    BASOSABS 0.00 12/26/2019       CMP     Lab Results   Component Value Date     12/28/2019    K 4.0 12/28/2019     12/28/2019    CO2 21 12/28/2019    BUN 26 12/28/2019    CREATININE 1.2 12/28/2019    GFRAA >60 12/28/2019    LABGLOM 60 12/28/2019    GLUCOSE 104 12/28/2019    GLUCOSE 93 08/29/2011    PROT 6.0 12/28/2019    LABALBU 2.8 12/28/2019    LABALBU 4.2 08/29/2011    CALCIUM 8.2 12/28/2019    BILITOT 0.6 12/28/2019    ALKPHOS 67 12/28/2019    AST 38 12/28/2019    ALT 21 12/28/2019         Hepatic Function Panel:    Lab Results   Component Value Date    ALKPHOS 67 12/28/2019    ALT 21 12/28/2019    AST 38 12/28/2019    PROT 6.0 12/28/2019    BILITOT 0.6 12/28/2019    LABALBU 2.8 12/28/2019    LABALBU 4.2 08/29/2011       PT/INR:  No results found for: PROTIME, INR    TSH:  No results found for: TSH    U/A:    Lab Results   Component Value Date    COLORU Yellow 12/26/2019    PHUR 5.5 12/26/2019    WBCUA PACKED 12/26/2019    RBCUA 2-5 12/26/2019    BACTERIA MANY 12/26/2019    CLARITYU TURBID 12/26/2019    SPECGRAV >=1.030 12/26/2019    LEUKOCYTESUR MODERATE 12/26/2019    UROBILINOGEN 1.0 12/26/2019    BILIRUBINUR Negative 12/26/2019    BLOODU LARGE 12/26/2019    GLUCOSEU Negative 12/26/2019       ABG:  No results found for: YZY4MUR, BEART, B5HKZYFV, PHART, THGBART, NEV4JDT, PO2ART, SRW9BIX    MICROBIOLOGY:    Blood culture - E coli      Urine Culture -  E coli     Radiology :    Chest X ray - no infiltrates     Ultrasound of kidney -    Left-sided mild hydronephrosis, but otherwise normal  bilateral renal appearance without cortical abnormalities or  calcification. Normal bladder appearance with an underlying enlarged prostate,  dimensions listed above. IMPRESSION:     1. E coli sepsis, bacteremia  2. Complicated UTI  3. Leukocytosis         RECOMMENDATIONS:      1. Rocephin 1 gram IV q 24 hr s  2. Await blood and urine cx   3. CBC with diff   4.  D/C on oral

## 2019-12-29 ENCOUNTER — APPOINTMENT (OUTPATIENT)
Dept: GENERAL RADIOLOGY | Age: 69
DRG: 872 | End: 2019-12-29
Payer: MEDICARE

## 2019-12-29 LAB
ALBUMIN SERPL-MCNC: 2.8 G/DL (ref 3.5–5.2)
ALP BLD-CCNC: 96 U/L (ref 40–129)
ALT SERPL-CCNC: 23 U/L (ref 0–40)
ANION GAP SERPL CALCULATED.3IONS-SCNC: 12 MMOL/L (ref 7–16)
AST SERPL-CCNC: 30 U/L (ref 0–39)
BILIRUB SERPL-MCNC: 0.9 MG/DL (ref 0–1.2)
BUN BLDV-MCNC: 16 MG/DL (ref 8–23)
CALCIUM SERPL-MCNC: 8.3 MG/DL (ref 8.6–10.2)
CHLORIDE BLD-SCNC: 102 MMOL/L (ref 98–107)
CO2: 24 MMOL/L (ref 22–29)
CREAT SERPL-MCNC: 1 MG/DL (ref 0.7–1.2)
CULTURE, BLOOD 2: ABNORMAL
GFR AFRICAN AMERICAN: >60
GFR NON-AFRICAN AMERICAN: >60 ML/MIN/1.73
GLUCOSE BLD-MCNC: 96 MG/DL (ref 74–99)
HCT VFR BLD CALC: 44.1 % (ref 37–54)
HEMOGLOBIN: 14.3 G/DL (ref 12.5–16.5)
MCH RBC QN AUTO: 28.9 PG (ref 26–35)
MCHC RBC AUTO-ENTMCNC: 32.4 % (ref 32–34.5)
MCV RBC AUTO: 89.3 FL (ref 80–99.9)
ORGANISM: ABNORMAL
PDW BLD-RTO: 14.1 FL (ref 11.5–15)
PLATELET # BLD: 136 E9/L (ref 130–450)
PMV BLD AUTO: 12 FL (ref 7–12)
POTASSIUM REFLEX MAGNESIUM: 4 MMOL/L (ref 3.5–5)
RBC # BLD: 4.94 E12/L (ref 3.8–5.8)
SODIUM BLD-SCNC: 138 MMOL/L (ref 132–146)
TOTAL PROTEIN: 6.3 G/DL (ref 6.4–8.3)
URINE CULTURE, ROUTINE: ABNORMAL
WBC # BLD: 12.4 E9/L (ref 4.5–11.5)

## 2019-12-29 PROCEDURE — 6360000002 HC RX W HCPCS: Performed by: INTERNAL MEDICINE

## 2019-12-29 PROCEDURE — 80053 COMPREHEN METABOLIC PANEL: CPT

## 2019-12-29 PROCEDURE — 6370000000 HC RX 637 (ALT 250 FOR IP): Performed by: INTERNAL MEDICINE

## 2019-12-29 PROCEDURE — 36415 COLL VENOUS BLD VENIPUNCTURE: CPT

## 2019-12-29 PROCEDURE — 2060000000 HC ICU INTERMEDIATE R&B

## 2019-12-29 PROCEDURE — 73030 X-RAY EXAM OF SHOULDER: CPT

## 2019-12-29 PROCEDURE — 85027 COMPLETE CBC AUTOMATED: CPT

## 2019-12-29 RX ORDER — HYDRALAZINE HYDROCHLORIDE 20 MG/ML
10 INJECTION INTRAMUSCULAR; INTRAVENOUS EVERY 4 HOURS PRN
Status: DISCONTINUED | OUTPATIENT
Start: 2019-12-29 | End: 2019-12-31 | Stop reason: HOSPADM

## 2019-12-29 RX ORDER — LEVOFLOXACIN 500 MG/1
500 TABLET, FILM COATED ORAL DAILY
Qty: 7 TABLET | Refills: 0 | Status: SHIPPED | OUTPATIENT
Start: 2019-12-29 | End: 2020-01-05

## 2019-12-29 RX ORDER — AMLODIPINE BESYLATE 5 MG/1
5 TABLET ORAL DAILY
Status: DISCONTINUED | OUTPATIENT
Start: 2019-12-29 | End: 2019-12-30

## 2019-12-29 RX ORDER — LEVOFLOXACIN 500 MG/1
500 TABLET, FILM COATED ORAL DAILY
Status: DISCONTINUED | OUTPATIENT
Start: 2019-12-29 | End: 2019-12-31 | Stop reason: HOSPADM

## 2019-12-29 RX ORDER — LACTULOSE 10 G/15ML
20 SOLUTION ORAL DAILY
Status: DISCONTINUED | OUTPATIENT
Start: 2019-12-29 | End: 2019-12-31 | Stop reason: HOSPADM

## 2019-12-29 RX ADMIN — HYDROCODONE BITARTRATE AND ACETAMINOPHEN 1 TABLET: 5; 325 TABLET ORAL at 07:51

## 2019-12-29 RX ADMIN — POTASSIUM CHLORIDE 10 MEQ: 750 TABLET, EXTENDED RELEASE ORAL at 07:50

## 2019-12-29 RX ADMIN — HYDRALAZINE HYDROCHLORIDE 10 MG: 20 INJECTION INTRAMUSCULAR; INTRAVENOUS at 11:59

## 2019-12-29 RX ADMIN — LACTULOSE 20 G: 20 SOLUTION ORAL at 15:28

## 2019-12-29 RX ADMIN — HEPARIN SODIUM 5000 UNITS: 10000 INJECTION INTRAVENOUS; SUBCUTANEOUS at 21:50

## 2019-12-29 RX ADMIN — AMLODIPINE BESYLATE 5 MG: 5 TABLET ORAL at 11:57

## 2019-12-29 RX ADMIN — LEVOFLOXACIN 500 MG: 500 TABLET, FILM COATED ORAL at 15:27

## 2019-12-29 RX ADMIN — HYDROCODONE BITARTRATE AND ACETAMINOPHEN 1 TABLET: 5; 325 TABLET ORAL at 20:35

## 2019-12-29 RX ADMIN — HYDROCODONE BITARTRATE AND ACETAMINOPHEN 1 TABLET: 5; 325 TABLET ORAL at 00:30

## 2019-12-29 RX ADMIN — HYDRALAZINE HYDROCHLORIDE 10 MG: 20 INJECTION INTRAMUSCULAR; INTRAVENOUS at 20:35

## 2019-12-29 RX ADMIN — HEPARIN SODIUM 5000 UNITS: 10000 INJECTION INTRAVENOUS; SUBCUTANEOUS at 06:11

## 2019-12-29 RX ADMIN — HEPARIN SODIUM 5000 UNITS: 10000 INJECTION INTRAVENOUS; SUBCUTANEOUS at 14:01

## 2019-12-29 ASSESSMENT — PAIN SCALES - GENERAL
PAINLEVEL_OUTOF10: 5
PAINLEVEL_OUTOF10: 5
PAINLEVEL_OUTOF10: 4
PAINLEVEL_OUTOF10: 5

## 2019-12-29 NOTE — PROGRESS NOTES
Nephrology Progress Note  Patient's Name: Slime Hunt  11:39 AM  12/29/2019        Reason for Consult: Acute kidney injury  Requesting Physician:  No primary care provider on file. Chief Complaint: Chills  History Obtained From:  Patient    History of Present Ilness:    Slime Hunt is a 68 y.o. male with no significant past medical history. Patient presents to hospital with complaints of abdominal pain associated with constipation which started several days ago. He did take a laxative concoction which included prune juice and warm water. This led to diarrhea which was self-limited. A day or so later he began to experience some nausea associated with emesis and chills. This prompted his ED visit. On presentation his initial vitals showed a blood pressure of 94/57 temperature 99.7 and a pulse of 130. Laboratory data was significant for mild leukocytosis of 12,000, hemoglobin 16.7, hematocrit 50.9 and a platelet count of 874,562. Urinalysis showed positive nitrite moderate leukocyte esterase. Lactic acid was 3. [facility] profile showed a sodium of 136, potassium 3.4, CO2 19, BUN 26 and a creatinine 1. 8(previous lab data showed a creatinine of 1.6 in August 2011). Patient was started on IV fluids and admitted to telemetry for further management.     Subjective    12/28: says appetite is sightly more improved; but that he is only eating 50% of whats on his tray  12/29: BP trending high        Current Medications:    melatonin tablet 3 mg, Nightly PRN  HYDROcodone-acetaminophen (NORCO) 5-325 MG per tablet 1 tablet, Q6H PRN  cefTRIAXone (ROCEPHIN) 1 g in sterile water 10 mL IV syringe, Q24H  acetaminophen (TYLENOL) tablet 650 mg, Q4H PRN  heparin (porcine) injection 5,000 Units, Q8H  potassium chloride (KLOR-CON M) extended release tablet 10 mEq, Daily  ondansetron (ZOFRAN) injection 4 mg, Q6H PRN        Physical exam:  Vitals:    12/29/19 0748   BP: (!) 170/80   Pulse: 80   Resp: 18   Temp: 98.6 °F (37 °C) SpO2: 95%           General: No distress. Alert. Eyes: PERRL. No sclera icterus. No conjunctival injection. ENT: No discharge. Pharynx clear. Neck: Trachea midline. Normal thyroid. Lungs: No accessory muscle use. No crackles. No wheezing. No rhonchi. CV: Regular rate. Regular rhythm. No murmur or rub. .   Abd: Non-tender. Non-distended. No masses. No organmegaly. Normal bowel sounds. Skin: Warm and dry. No nodule on exposed extremities. No rash on exposed extremities. Ext: No cyanosis, clubbing, edema   Neuro: Awake. Follows commands. Positive pupils/gag/corneals. Normal pain response. Data:   Labs:  Lab Results   Component Value Date     12/29/2019     12/28/2019     12/27/2019    K 4.0 12/29/2019    K 4.0 12/28/2019    K 3.5 12/27/2019     12/29/2019    CO2 24 12/29/2019    CO2 21 (L) 12/28/2019    CO2 18 (L) 12/27/2019    CREATININE 1.0 12/29/2019    CREATININE 1.2 12/28/2019    CREATININE 1.7 (H) 12/27/2019    BUN 16 12/29/2019    BUN 26 (H) 12/28/2019    BUN 28 (H) 12/27/2019    GLUCOSE 96 12/29/2019    GLUCOSE 104 (H) 12/28/2019    GLUCOSE 127 (H) 12/27/2019    WBC 12.4 (H) 12/29/2019    WBC 17.5 (H) 12/28/2019    WBC 30.5 (H) 12/27/2019    HGB 14.3 12/29/2019    HGB 14.3 12/28/2019    HGB 14.6 12/27/2019    HCT 44.1 12/29/2019    HCT 43.6 12/28/2019    HCT 44.0 12/27/2019    MCV 89.3 12/29/2019     12/29/2019         Imaging:  Xr Chest Portable    Result Date: 12/26/2019  Clinical indications: Sepsis. TECHNIQUE: Single frontal projection of the chest (1 view). COMPARISON: None. FINDINGS: The heart is enlarged. There is elevation of the right hemidiaphragm with contiguous atelectasis. Slight atelectasis or scarring in the left lung base. The heart, lungs, mediastinum and regional skeleton are otherwise unremarkable. Dilatation of the right hemidiaphragm with contiguous atelectasis. Slight atelectasis or scarring in the left lung base.

## 2019-12-29 NOTE — PROGRESS NOTES
Department of Internal Medicine  Infectious Diseases  Progress Note      C/C : E coli sepsis, bacteremia     Pt is awake and alert  Denies fever and chills  Feels better  Denies flank pain     Afebrile    Current Facility-Administered Medications   Medication Dose Route Frequency Provider Last Rate Last Dose    amLODIPine (NORVASC) tablet 5 mg  5 mg Oral Daily Pedrito Gastelum MD   5 mg at 12/29/19 1157    hydrALAZINE (APRESOLINE) injection 10 mg  10 mg Intravenous Q4H PRN Pedrito Gastelum MD   10 mg at 12/29/19 1159    melatonin tablet 3 mg  3 mg Oral Nightly PRN Jovita Skelton MD   3 mg at 12/28/19 0210    HYDROcodone-acetaminophen (NORCO) 5-325 MG per tablet 1 tablet  1 tablet Oral Q6H PRN Chelita Arroyo, DO   1 tablet at 12/29/19 0751    cefTRIAXone (ROCEPHIN) 1 g in sterile water 10 mL IV syringe  1 g Intravenous Q24H Sourav Webber MD   1 g at 12/28/19 1505    acetaminophen (TYLENOL) tablet 650 mg  650 mg Oral Q4H PRN Chelita Arroyo, DO   650 mg at 12/28/19 0431    heparin (porcine) injection 5,000 Units  5,000 Units Subcutaneous Q8H Chelita Aroryo, DO   5,000 Units at 12/29/19 8608    potassium chloride (KLOR-CON M) extended release tablet 10 mEq  10 mEq Oral Daily Chelita Arroyo, DO   10 mEq at 12/29/19 0750    ondansetron (ZOFRAN) injection 4 mg  4 mg Intravenous Q6H PRN Chelita Arroyo, DO           REVIEW OF SYSTEMS:      CONSTITUTIONAL: Denies fever and chills    HEENT: denies blurring of vision or double vision, denies hearing problem  RESPIRATORY: denies cough, shortness of breath, sputum expectoration, chest pain. CARDIOVASCULAR:  Denies palpitation  GASTROINTESTINAL:  Abdomen pain, vomiting   GENITOURINARY:  Denies burning urination or frequency of urination  INTEGUMENT: denies wound , rash  HEMATOLOGIC/LYMPHATIC:  Denies lymph node swelling, gum bleeding or easy bruising.   MUSCULOSKELETAL:  Denies leg pain , joint pain , joint swelling  NEUROLOGICAL:  Denies light headed, dizziness, AST 30 12/29/2019    PROT 6.3 12/29/2019    BILITOT 0.9 12/29/2019    LABALBU 2.8 12/29/2019    LABALBU 4.2 08/29/2011       PT/INR:  No results found for: PROTIME, INR    TSH:  No results found for: TSH    U/A:    Lab Results   Component Value Date    COLORU Yellow 12/26/2019    PHUR 5.5 12/26/2019    WBCUA PACKED 12/26/2019    RBCUA 2-5 12/26/2019    BACTERIA MANY 12/26/2019    CLARITYU TURBID 12/26/2019    SPECGRAV >=1.030 12/26/2019    LEUKOCYTESUR MODERATE 12/26/2019    UROBILINOGEN 1.0 12/26/2019    BILIRUBINUR Negative 12/26/2019    BLOODU LARGE 12/26/2019    GLUCOSEU Negative 12/26/2019       ABG:  No results found for: RRR6WQZ, BEART, E5TKJUPY, PHART, THGBART, DMF3YFM, PO2ART, JZI6SXA    MICROBIOLOGY:    Blood culture - E coli      Urine Culture -  E coli     Radiology :    Chest X ray - no infiltrates     Ultrasound of kidney -    Left-sided mild hydronephrosis, but otherwise normal  bilateral renal appearance without cortical abnormalities or  calcification. Normal bladder appearance with an underlying enlarged prostate,  dimensions listed above. IMPRESSION:     1. E coli sepsis, bacteremia  2. Complicated UTI  3. Leukocytosis -trending down         RECOMMENDATIONS:      1. Stop rocephin   2.  Levaquin 500 mg po q 24 hr x 1 week

## 2019-12-30 ENCOUNTER — APPOINTMENT (OUTPATIENT)
Dept: NUCLEAR MEDICINE | Age: 69
DRG: 872 | End: 2019-12-30
Payer: MEDICARE

## 2019-12-30 ENCOUNTER — APPOINTMENT (OUTPATIENT)
Dept: CT IMAGING | Age: 69
DRG: 872 | End: 2019-12-30
Payer: MEDICARE

## 2019-12-30 LAB
ALBUMIN SERPL-MCNC: 2.6 G/DL (ref 3.5–5.2)
ALP BLD-CCNC: 116 U/L (ref 40–129)
ALT SERPL-CCNC: 27 U/L (ref 0–40)
ANION GAP SERPL CALCULATED.3IONS-SCNC: 15 MMOL/L (ref 7–16)
AST SERPL-CCNC: 30 U/L (ref 0–39)
BILIRUB SERPL-MCNC: 1.1 MG/DL (ref 0–1.2)
BUN BLDV-MCNC: 14 MG/DL (ref 8–23)
CALCIUM SERPL-MCNC: 8.4 MG/DL (ref 8.6–10.2)
CHLORIDE BLD-SCNC: 101 MMOL/L (ref 98–107)
CO2: 22 MMOL/L (ref 22–29)
CREAT SERPL-MCNC: 1 MG/DL (ref 0.7–1.2)
GFR AFRICAN AMERICAN: >60
GFR NON-AFRICAN AMERICAN: >60 ML/MIN/1.73
GLUCOSE BLD-MCNC: 87 MG/DL (ref 74–99)
HCT VFR BLD CALC: 45.8 % (ref 37–54)
HEMOGLOBIN: 15.1 G/DL (ref 12.5–16.5)
MCH RBC QN AUTO: 29.2 PG (ref 26–35)
MCHC RBC AUTO-ENTMCNC: 33 % (ref 32–34.5)
MCV RBC AUTO: 88.6 FL (ref 80–99.9)
PDW BLD-RTO: 13.9 FL (ref 11.5–15)
PLATELET # BLD: 151 E9/L (ref 130–450)
PMV BLD AUTO: 11.7 FL (ref 7–12)
POTASSIUM REFLEX MAGNESIUM: 3.8 MMOL/L (ref 3.5–5)
RBC # BLD: 5.17 E12/L (ref 3.8–5.8)
SODIUM BLD-SCNC: 138 MMOL/L (ref 132–146)
TOTAL PROTEIN: 5.9 G/DL (ref 6.4–8.3)
WBC # BLD: 10.5 E9/L (ref 4.5–11.5)

## 2019-12-30 PROCEDURE — 36415 COLL VENOUS BLD VENIPUNCTURE: CPT

## 2019-12-30 PROCEDURE — 74176 CT ABD & PELVIS W/O CONTRAST: CPT

## 2019-12-30 PROCEDURE — 6370000000 HC RX 637 (ALT 250 FOR IP): Performed by: INTERNAL MEDICINE

## 2019-12-30 PROCEDURE — 78708 K FLOW/FUNCT IMAGE W/DRUG: CPT

## 2019-12-30 PROCEDURE — 85027 COMPLETE CBC AUTOMATED: CPT

## 2019-12-30 PROCEDURE — 6360000002 HC RX W HCPCS: Performed by: INTERNAL MEDICINE

## 2019-12-30 PROCEDURE — 80053 COMPREHEN METABOLIC PANEL: CPT

## 2019-12-30 PROCEDURE — 3430000000 HC RX DIAGNOSTIC RADIOPHARMACEUTICAL: Performed by: RADIOLOGY

## 2019-12-30 PROCEDURE — A9562 TC99M MERTIATIDE: HCPCS | Performed by: RADIOLOGY

## 2019-12-30 PROCEDURE — 2060000000 HC ICU INTERMEDIATE R&B

## 2019-12-30 RX ORDER — AMLODIPINE BESYLATE 10 MG/1
10 TABLET ORAL DAILY
Status: DISCONTINUED | OUTPATIENT
Start: 2019-12-30 | End: 2019-12-31 | Stop reason: HOSPADM

## 2019-12-30 RX ORDER — FUROSEMIDE 10 MG/ML
10 INJECTION INTRAMUSCULAR; INTRAVENOUS ONCE
Status: COMPLETED | OUTPATIENT
Start: 2019-12-30 | End: 2019-12-30

## 2019-12-30 RX ORDER — AMLODIPINE BESYLATE 10 MG/1
10 TABLET ORAL DAILY
Qty: 30 TABLET | Refills: 3 | Status: SHIPPED | OUTPATIENT
Start: 2019-12-30 | End: 2020-01-14 | Stop reason: ALTCHOICE

## 2019-12-30 RX ADMIN — AMLODIPINE BESYLATE 10 MG: 10 TABLET ORAL at 10:37

## 2019-12-30 RX ADMIN — HYDRALAZINE HYDROCHLORIDE 10 MG: 20 INJECTION INTRAMUSCULAR; INTRAVENOUS at 05:36

## 2019-12-30 RX ADMIN — LEVOFLOXACIN 500 MG: 500 TABLET, FILM COATED ORAL at 08:49

## 2019-12-30 RX ADMIN — FUROSEMIDE 10 MG: 10 INJECTION, SOLUTION INTRAMUSCULAR; INTRAVENOUS at 12:12

## 2019-12-30 RX ADMIN — HEPARIN SODIUM 5000 UNITS: 10000 INJECTION INTRAVENOUS; SUBCUTANEOUS at 05:29

## 2019-12-30 RX ADMIN — Medication 9.7 MILLICURIE: at 12:57

## 2019-12-30 RX ADMIN — LACTULOSE 20 G: 20 SOLUTION ORAL at 08:49

## 2019-12-30 RX ADMIN — HEPARIN SODIUM 5000 UNITS: 10000 INJECTION INTRAVENOUS; SUBCUTANEOUS at 23:26

## 2019-12-30 ASSESSMENT — PAIN SCALES - GENERAL
PAINLEVEL_OUTOF10: 0

## 2019-12-30 NOTE — PROGRESS NOTES
contiguous atelectasis. Slight atelectasis or scarring in the left lung base. Assessment/Plans  1. Chronic kidney disease stage III etiology not entirely clear. He does have a longstanding use of NSAIDs for \"arthritis\". This may be a contributing factor to his CKD; there is an obstructive component   -Renal flow scan c/w obstruction; will obtain a urology consult  2. UTI; culture shows E coli; now on levaquin  3. Sepsis, blood cultures also shows GNR ; now switched to levaquin oral  4. Dehydration, now improved  5.  Mild hypokalemia; improved with supplement; will continue to monitor    Discharge pending evaluation by Urology            Herve Leon MD  2:43 PM  12/30/2019

## 2019-12-30 NOTE — PROGRESS NOTES
Department of Internal Medicine  Infectious Diseases  Progress Note      C/C : E coli sepsis, bacteremia     Pt is awake and alert  Denies fever and chills  Feels better  Denies flank pain     Afebrile    Current Facility-Administered Medications   Medication Dose Route Frequency Provider Last Rate Last Dose    amLODIPine (NORVASC) tablet 10 mg  10 mg Oral Daily Grzegorz Larina Curling, DO   10 mg at 12/30/19 1037    hydrALAZINE (APRESOLINE) injection 10 mg  10 mg Intravenous Q4H PRN Ed Ever Gastelum MD   10 mg at 12/30/19 0536    levofloxacin (LEVAQUIN) tablet 500 mg  500 mg Oral Daily Sourav Webber MD   500 mg at 12/30/19 0849    lactulose (CHRONULAC) 10 GM/15ML solution 20 g  20 g Oral Daily Grzegorz Larina Curling, DO   20 g at 12/30/19 0849    melatonin tablet 3 mg  3 mg Oral Nightly PRN Susanna Yanes MD   3 mg at 12/28/19 0210    HYDROcodone-acetaminophen (NORCO) 5-325 MG per tablet 1 tablet  1 tablet Oral Q6H PRN Vara Pun, DO   1 tablet at 12/29/19 2035    acetaminophen (TYLENOL) tablet 650 mg  650 mg Oral Q4H PRN Vara Pun, DO   650 mg at 12/28/19 0431    heparin (porcine) injection 5,000 Units  5,000 Units Subcutaneous Q8H Vara Pun, DO   5,000 Units at 12/30/19 0529    ondansetron (ZOFRAN) injection 4 mg  4 mg Intravenous Q6H PRN Vara Pun, DO           REVIEW OF SYSTEMS:      CONSTITUTIONAL: Denies fever and chills    HEENT: denies blurring of vision or double vision, denies hearing problem  RESPIRATORY: denies cough, shortness of breath, sputum expectoration, chest pain. CARDIOVASCULAR:  Denies palpitation  GASTROINTESTINAL:  Abdomen pain, vomiting   GENITOURINARY:  Denies burning urination or frequency of urination  INTEGUMENT: denies wound , rash  HEMATOLOGIC/LYMPHATIC:  Denies lymph node swelling, gum bleeding or easy bruising.   MUSCULOSKELETAL:  Denies leg pain , joint pain , joint swelling  NEUROLOGICAL:  Denies light headed, dizziness, loss of consciousness, weakness

## 2019-12-30 NOTE — PROGRESS NOTES
The patient and his wife are updated about discharge being cancelled d/t further testing. Pt. Placed back on telemetry.

## 2019-12-30 NOTE — PROGRESS NOTES
Hospitalist Progress Note      PCP: Jennifer Rodirguez MD    Date of Admission: 12/26/2019    Chief Complaint: vomiting and fever    Hospital Course: *became dehydrated, found to be septic due to a UTI with ecoli**  For urology eval due to hydronephrosis on the left    Subjective: **wants to discharge      Medications:  Reviewed    Infusion Medications   Scheduled Medications    amLODIPine  10 mg Oral Daily    levofloxacin  500 mg Oral Daily    lactulose  20 g Oral Daily    heparin (porcine)  5,000 Units Subcutaneous Q8H     PRN Meds: hydrALAZINE, melatonin, HYDROcodone 5 mg - acetaminophen, acetaminophen, ondansetron      Intake/Output Summary (Last 24 hours) at 12/30/2019 1847  Last data filed at 12/30/2019 1815  Gross per 24 hour   Intake 480 ml   Output 850 ml   Net -370 ml       Exam:    BP (!) 152/72   Pulse 102   Temp 98.2 °F (36.8 °C) (Temporal)   Resp 18   Ht 5' 10\" (1.778 m)   Wt 226 lb 3.2 oz (102.6 kg)   SpO2 98%   BMI 32.46 kg/m²         Gen: *well developed  HEENT: NC/AT, moist mucous membranes, no oropharyngeal erythema or exudate  Neck: supple, trachea midline, no anterior cervical or SC LAD  Heart:  Normal s1/s2, RRR, no murmurs, gallops, or rubs. Lungs:  cta ** bilaterally, *  Abd: bowel sounds present, soft, nontender, nondistended, no masses  Extrem:  No clubbing, cyanosis,  No ** edema  Skin: no rashes or lesions  Psych: A & O x3  Neuro: grossly intact, moves all four extremities.     Capillary Refill: Brisk,< 3 seconds   Peripheral Pulses: +2 palpable, equal bilaterally              Labs:   Recent Labs     12/28/19 0436 12/29/19 0433 12/30/19  0428   WBC 17.5* 12.4* 10.5   HGB 14.3 14.3 15.1   HCT 43.6 44.1 45.8   * 136 151     Recent Labs     12/28/19 0436 12/29/19 0433 12/30/19  0428    138 138   K 4.0 4.0 3.8    102 101   CO2 21* 24 22   BUN 26* 16 14   CREATININE 1.2 1.0 1.0   CALCIUM 8.2* 8.3* 8.4*     Recent Labs     12/28/19  0436 12/29/19  0436 12/30/19  0428   AST 38 30 30   ALT 21 23 27   BILITOT 0.6 0.9 1.1   ALKPHOS 67 96 116     No results for input(s): INR in the last 72 hours. No results for input(s): Eugune Ran in the last 72 hours. Recent Labs     12/28/19  0436 12/29/19  0433 12/30/19  0428   AST 38 30 30   ALT 21 23 27   BILITOT 0.6 0.9 1.1   ALKPHOS 67 96 116     No results for input(s): LACTA in the last 72 hours. No results found for: Nancyann Essex  No results found for: AMMONIA    Assessment:    Active Hospital Problems    Diagnosis Date Noted    [facility] [A41.9] 12/26/2019   UTi due to e coli   dehydration   CKD 3   HTN  Left hydronephrosis    Plan:  *cont levaquin  Cont norvasc   lactulose   urology consult     DVT Prophylaxis: *heparin  Diet: DIET NO SALT ADDED (3-4 GM);   Code Status: DNR-CCA     PT/OT Eval Status: *ordered     Dispo - *home    Electronically signed by Kate Vazquez DO on 12/30/2019 at 6:47 PM [facility]

## 2019-12-31 ENCOUNTER — APPOINTMENT (OUTPATIENT)
Dept: GENERAL RADIOLOGY | Age: 69
DRG: 872 | End: 2019-12-31
Payer: MEDICARE

## 2019-12-31 VITALS
DIASTOLIC BLOOD PRESSURE: 66 MMHG | OXYGEN SATURATION: 98 % | SYSTOLIC BLOOD PRESSURE: 122 MMHG | TEMPERATURE: 97 F | BODY MASS INDEX: 32.38 KG/M2 | HEIGHT: 70 IN | HEART RATE: 92 BPM | WEIGHT: 226.2 LBS | RESPIRATION RATE: 18 BRPM

## 2019-12-31 LAB
ALBUMIN SERPL-MCNC: 3.1 G/DL (ref 3.5–5.2)
ALP BLD-CCNC: 140 U/L (ref 40–129)
ALT SERPL-CCNC: 37 U/L (ref 0–40)
ANION GAP SERPL CALCULATED.3IONS-SCNC: 13 MMOL/L (ref 7–16)
AST SERPL-CCNC: 35 U/L (ref 0–39)
BILIRUB SERPL-MCNC: 1 MG/DL (ref 0–1.2)
BUN BLDV-MCNC: 16 MG/DL (ref 8–23)
CALCIUM SERPL-MCNC: 9 MG/DL (ref 8.6–10.2)
CHLORIDE BLD-SCNC: 97 MMOL/L (ref 98–107)
CO2: 27 MMOL/L (ref 22–29)
CREAT SERPL-MCNC: 1 MG/DL (ref 0.7–1.2)
GFR AFRICAN AMERICAN: >60
GFR NON-AFRICAN AMERICAN: >60 ML/MIN/1.73
GLUCOSE BLD-MCNC: 101 MG/DL (ref 74–99)
HCT VFR BLD CALC: 48.4 % (ref 37–54)
HEMOGLOBIN: 16 G/DL (ref 12.5–16.5)
MCH RBC QN AUTO: 29 PG (ref 26–35)
MCHC RBC AUTO-ENTMCNC: 33.1 % (ref 32–34.5)
MCV RBC AUTO: 87.8 FL (ref 80–99.9)
PDW BLD-RTO: 13.8 FL (ref 11.5–15)
PLATELET # BLD: 184 E9/L (ref 130–450)
PMV BLD AUTO: 12 FL (ref 7–12)
POTASSIUM REFLEX MAGNESIUM: 3.7 MMOL/L (ref 3.5–5)
PROSTATE SPECIFIC ANTIGEN: 18.25 NG/ML (ref 0–4)
RBC # BLD: 5.51 E12/L (ref 3.8–5.8)
SODIUM BLD-SCNC: 137 MMOL/L (ref 132–146)
TOTAL PROTEIN: 6.7 G/DL (ref 6.4–8.3)
WBC # BLD: 10.5 E9/L (ref 4.5–11.5)

## 2019-12-31 PROCEDURE — 6370000000 HC RX 637 (ALT 250 FOR IP): Performed by: INTERNAL MEDICINE

## 2019-12-31 PROCEDURE — 85027 COMPLETE CBC AUTOMATED: CPT

## 2019-12-31 PROCEDURE — 84153 ASSAY OF PSA TOTAL: CPT

## 2019-12-31 PROCEDURE — 80053 COMPREHEN METABOLIC PANEL: CPT

## 2019-12-31 PROCEDURE — 36415 COLL VENOUS BLD VENIPUNCTURE: CPT

## 2019-12-31 PROCEDURE — 74018 RADEX ABDOMEN 1 VIEW: CPT

## 2019-12-31 RX ADMIN — AMLODIPINE BESYLATE 10 MG: 10 TABLET ORAL at 10:35

## 2019-12-31 RX ADMIN — LEVOFLOXACIN 500 MG: 500 TABLET, FILM COATED ORAL at 10:35

## 2019-12-31 ASSESSMENT — PAIN SCALES - GENERAL
PAINLEVEL_OUTOF10: 0

## 2019-12-31 NOTE — PROGRESS NOTES
Eda Nava in [facility] stated to this nurse \"We don't have any bladder scanners currently! \"

## 2019-12-31 NOTE — PROGRESS NOTES
Hospitalist Progress Note      PCP: Tessa Wells MD    Date of Admission: 12/26/2019    Chief Complaint: * vomiting and fever    Hospital Course: *became dehydrated, found to be septic due to a UTI with ecoli**  For urology eval due to hydronephrosis on the left** to be treated for UTI and will follow up with urology as an OP    Subjective: *wants to go home       Medications:  Reviewed    Infusion Medications   Scheduled Medications    amLODIPine  10 mg Oral Daily    levofloxacin  500 mg Oral Daily    lactulose  20 g Oral Daily    heparin (porcine)  5,000 Units Subcutaneous Q8H     PRN Meds: hydrALAZINE, melatonin, HYDROcodone 5 mg - acetaminophen, acetaminophen, ondansetron      Intake/Output Summary (Last 24 hours) at 12/31/2019 1657  Last data filed at 12/31/2019 1345  Gross per 24 hour   Intake 1080 ml   Output 1000 ml   Net 80 ml       Exam:    /66   Pulse 92   Temp 97 °F (36.1 °C) (Temporal)   Resp 18   Ht 5' 10\" (1.778 m)   Wt 226 lb 3.2 oz (102.6 kg)   SpO2 98%   BMI 32.46 kg/m²         Gen: *well developed  HEENT: NC/AT, moist mucous membranes, no oropharyngeal erythema or exudate  Neck: supple, trachea midline, no anterior cervical or SC LAD  Heart:  Normal s1/s2, RRR, no murmurs, gallops, or rubs.    Lungs:  cta ** bilaterally, *  Abd: bowel sounds present, soft, nontender, nondistended, no masses  Extrem:  No clubbing, cyanosis,  No ** edema  Skin: no rashes or lesions  Psych: A & O x3  Neuro: grossly intact, moves all four extremities.    Capillary Refill: Brisk,< 3 seconds   Peripheral Pulses: +2 palpable, equal bilaterally             Labs:   Recent Labs     12/29/19 0433 12/30/19 0428 12/31/19 0439   WBC 12.4* 10.5 10.5   HGB 14.3 15.1 16.0   HCT 44.1 45.8 48.4    151 184     Recent Labs     12/29/19  0433 12/30/19 0428 12/31/19 0439    138 137   K 4.0 3.8 3.7    101 97*   CO2 24 22 27   BUN 16 14 16   CREATININE 1.0 1.0 1.0   CALCIUM 8.3* 8.4* 9.0

## 2019-12-31 NOTE — PROGRESS NOTES
Nephrology Progress Note  Patient's Name: Mega Napier  1:29 PM  12/31/2019        Reason for Consult: Acute kidney injury  Requesting Physician:  Steve Gtz MD    Chief Complaint: Chills  History Obtained From:  Patient    History of Present Ilness:    Mega Napier is a 68 y.o. male with no significant past medical history. Patient presents to hospital with complaints of abdominal pain associated with constipation which started several days ago. He did take a laxative concoction which included prune juice and warm water. This led to diarrhea which was self-limited. A day or so later he began to experience some nausea associated with emesis and chills. This prompted his ED visit. On presentation his initial vitals showed a blood pressure of 94/57 temperature 99.7 and a pulse of 130. Laboratory data was significant for mild leukocytosis of 12,000, hemoglobin 16.7, hematocrit 50.9 and a platelet count of 510,599. Urinalysis showed positive nitrite moderate leukocyte esterase. Lactic acid was 3. [place] [facility] profile showed a sodium of 136, potassium 3.4, CO2 19, BUN 26 and a creatinine 1. 8(previous lab data showed a creatinine of 1.6 in August 2011). Patient was started on IV fluids and admitted to telemetry for further management.     Subjective    12/28: says appetite is sightly more improved; but that he is only eating 50% of whats on his tray  12/29: BP trending high  12/30: Just returned from renal flow scan; he gives no c/o        Current Medications:    amLODIPine (NORVASC) tablet 10 mg, Daily  hydrALAZINE (APRESOLINE) injection 10 mg, Q4H PRN  levofloxacin (LEVAQUIN) tablet 500 mg, Daily  lactulose (CHRONULAC) 10 GM/15ML solution 20 g, Daily  melatonin tablet 3 mg, Nightly PRN  HYDROcodone-acetaminophen (NORCO) 5-325 MG per tablet 1 tablet, Q6H PRN  acetaminophen (TYLENOL) tablet 650 mg, Q4H PRN  heparin (porcine) injection 5,000 Units, Q8H  ondansetron (ZOFRAN) injection 4 mg, Q6H PRN        Physical exam:  Vitals:    12/31/19 1200   BP:    Pulse: 92   Resp: 18   Temp:    SpO2: 98%           General: No distress. Alert. Eyes: PERRL. No sclera icterus. No conjunctival injection. ENT: No discharge. Pharynx clear. Neck: Trachea midline. Normal thyroid. Lungs: No accessory muscle use. No crackles. No wheezing. No rhonchi. CV: Regular rate. Regular rhythm. No murmur or rub. .   Abd: Non-tender. Non-distended. No masses. No organmegaly. Normal bowel sounds. Skin: Warm and dry. No nodule on exposed extremities. No rash on exposed extremities. Ext: No cyanosis, clubbing, edema   Neuro: Awake. Follows commands. Positive pupils/gag/corneals. Normal pain response. Data:   Labs:  Lab Results   Component Value Date     12/31/2019     12/30/2019     12/29/2019    K 3.7 12/31/2019    K 3.8 12/30/2019    K 4.0 12/29/2019    CL 97 (L) 12/31/2019    CO2 27 12/31/2019    CO2 22 12/30/2019    CO2 24 12/29/2019    CREATININE 1.0 12/31/2019    CREATININE 1.0 12/30/2019    CREATININE 1.0 12/29/2019    BUN 16 12/31/2019    BUN 14 12/30/2019    BUN 16 12/29/2019    GLUCOSE 101 (H) 12/31/2019    GLUCOSE 87 12/30/2019    GLUCOSE 96 12/29/2019    WBC 10.5 12/31/2019    WBC 10.5 12/30/2019    WBC 12.4 (H) 12/29/2019    HGB 16.0 12/31/2019    HGB 15.1 12/30/2019    HGB 14.3 12/29/2019    HCT 48.4 12/31/2019    HCT 45.8 12/30/2019    HCT 44.1 12/29/2019    MCV 87.8 12/31/2019     12/31/2019         Imaging:  Xr Chest Portable    Result Date: 12/26/2019  Clinical indications: Sepsis. TECHNIQUE: Single frontal projection of the chest (1 view). COMPARISON: None. FINDINGS: The heart is enlarged. There is elevation of the right hemidiaphragm with contiguous atelectasis. Slight atelectasis or scarring in the left lung base. The heart, lungs, mediastinum and regional skeleton are otherwise unremarkable. Dilatation of the right hemidiaphragm with contiguous atelectasis.  Slight atelectasis or scarring in the left lung base. Assessment/Plans  1. Chronic kidney disease stage III inn part due to obstructive uropathy. He does have a longstanding use of NSAIDs for \"arthritis\". This may be a contributing factor to his CKD; there is an obstructive component   -Renal flow scan c/w obstruction; abdominal CT shows   2. UTI; culture shows E coli; now on levaquin; abdominal CT shows 7mm stone in UPJ; lplan is eventual ESWL  3. Sepsis, blood cultures also shows GNR ; now switched to levaquin oral  4. Dehydration, now improved  5.  Mild hypokalemia; improved with supplement; will continue to monitor    OK for discharge from a Renal standpoint            Apollo Saenz MD  1:29 PM  12/31/2019

## 2019-12-31 NOTE — PROGRESS NOTES
All discharge instructions reviewed with patient. He appeared to understand all instructions asking good questions.

## 2019-12-31 NOTE — CONSULTS
percent, time to peak was 5 min and   25 seconds, one half clearance time was 15 min and 54 seconds       Renal curves demonstrate perfusion to be unremarkable   Extraction was delayed on the left   Excretion was delayed on the left. There is improvement with Lasix administration           Impression   Findings compatible with obstruction on the left        EXAM: CT ABDOMEN PELVIS WO CONTRAST       COMPARISON: None       INDICATION:  right hydronephrosis , urinary tract infection    right hydronephrosis , urinary tract infection        TECHNIQUE:  Low-dose CT acquisition technique included one of the   following options; 1. Automated exposure control, 2. Adjustment of mA   and/or kV according to the patient's size or 3. Use of iterative   reconstruction.       FINDINGS:       There is a calculus located in left ureteropelvic junction measuring   approximately 7 mm with left hydronephrosis and left perinephric fat   stranding. No renal or ureteric calculus on the right. Multiple   phleboliths are seen in the pelvis. The prostate gland is enlarged   measuring 6.3 x 6.1 cm. Fat stranding is seen surrounding the urinary   bladder. There is mild thickened appearance of the urinary bladder   wall. Numerous diverticula are associated with the left colon and   sigmoid colon. No evidence of acute diverticulitis. No bowel   obstruction, free air, or free fluid. The appendix is visualized and   normal. Liver is unremarkable. Gallbladder is unremarkable. No   evidence of acute peritonitis. Spleen is nonenlarged. View of the lung   bases shows small left pleural effusion.           Impression       1. Calculus measuring 7 mm is located within left ureteropelvic   junction with left hydronephrosis and left perinephric edema.       2. Severe diverticulosis without evidence of acute diverticulitis.       3. Enlarged prostate gland.       4.  Inflammatory changes are associated with the urinary bladder   suggestive of nonspecific cystitis.       5. Small left pleural effusion.                  ASSESSMENT / PLAN:    Sepsis of urinary origin   E Coli bacteremia and UTI   Left proximal 7 mm obstructing stone  Left Hydroureteronephrosis   Acute kidney injury   BPH     Leukocytosis resolved, afebrile   ID managing antibiotics; levaquin   Azotemia resolved   Hemodynamically stable   KUB checked this morning and stone is visible  Will plan for ESWL once infection has cleared   He can resume diet from  standpoint   As long as he is free of pain, kidney function is stable, and infection continues to improve with antibiotics we will hold off on ureteral  stent   Continue to monitor renal function   ESWL will be done in the next several weeks as outpatient   He is happy with his voiding habits - no gu medications indicated   Will check PVR to ensure he in emptying adequately     ANDREAS Brizuela-CNP  7:53 AM  12/31/2019   I interviewed and examined the patient early this morning  We will insert a stent if he becomes febrile or has significant left flank pain  Otherwise he could be discharged we will schedule him for cystoscopy stent insertion and left ESWL if he is found to have pyelonephrosis the definitive procedure will be canceled and he will have a secondary procedure later time  Metabolic evaluation for stone disease will be done at a later time  I agree with the above note and plan by nurse practitioner Idalia Dodson

## 2019-12-31 NOTE — PROGRESS NOTES
CLINICAL PHARMACY NOTE: MEDS TO [address] Select Patient?: No  Total # of Prescriptions Filled: 2   The following medications were delivered to the patient:  · Amlodipine besylate 10  · Levofloxacin 500  Total # of Interventions Completed: 3  Time Spent (min): 30    Additional Documentation:

## 2020-01-02 NOTE — DISCHARGE SUMMARY
IP CONSULT TO INTERNAL MEDICINE  IP CONSULT TO NEPHROLOGY  IP CONSULT TO INFECTIOUS DISEASES  IP CONSULT TO UROLOGY    Labs: For convenience and continuity at follow-up the following most recent labs are provided:    Lab Results   Component Value Date    WBC 10.5 2019    HGB 16.0 2019    HCT 48.4 2019    MCV 87.8 2019     2019     2019    K 3.7 2019    CL 97 2019    CO2 27 2019    BUN 16 2019    CREATININE 1.0 2019    CALCIUM 9.0 2019    ALKPHOS 140 2019    ALT 37 2019    AST 35 2019    BILITOT 1.0 2019    LABALBU 3.1 2019    LABALBU 4.2 2011    LDLCALC 140 2011    TRIG 118 2011     No results found for: INR    Radiology:  XR ABDOMEN (K[facility]) (SINGLE AP VIEW)   Final Result   Unchanged position of left UPJ stone. No new pathologic findings. CT ABDOMEN PELVIS WO CONTRAST Additional Contrast? None   Final Result      1. Calculus measuring 7 mm is located within left ureteropelvic   junction with left hydronephrosis and left perinephric edema. 2. Severe diverticulosis without evidence of acute diverticulitis. 3. Enlarged prostate gland. 4. Inflammatory changes are associated with the urinary bladder   suggestive of nonspecific cystitis. 5. Small left pleural effusion. NM KIDNEY W FLOW AND FUNCTION W PHARMACOLOGICAL INTERVENTION   Final Result   Addendum 1 of 1   Patient MRN: [id number]   : 1950   Age:  68 years   Gender: Male   Order Date: 2019 11:45 AM   Exam: NM KIDNEY W FLOW AND FUNCTION W PHARMACOLOGICAL INTERVENTION   Number of Images: 4    views   Indication:   R/O OBSTRUCTION    R/O OBSTRUCTION   Comparison: None.       Findings:      The patient was injected with 9.7 mCi of technetium MAG3 intravenously      The patient was injected with 10 mg grams of Lasix IV at 30 minutes      Split uptake on the left was 45  percent,

## 2020-01-04 NOTE — ADT AUTH CERT
12/29/2019 Level of Care:    Guideline Day 3    Clinical Status    (X) * Mental status at baseline    ( ) * Fever absent or reduced    Routes    ( ) * Oral hydration    * Milestone   Additional Notes   12/28      Infectious Diseases   Progress Note           C/C : E coli sepsis, bacteremia        Pt is awake and alert   Denies fever and chills   Feels better   Afebrile       Ultrasound of kidney -       Left-sided mild hydronephrosis, but otherwise normal   bilateral renal appearance without cortical abnormalities or   calcification. Normal bladder appearance with an underlying enlarged prostate,   dimensions listed above.       IMPRESSION:        1. E coli sepsis, bacteremia   2. Complicated UTI   3. Leukocytosis                RECOMMENDATIONS:         1. Rocephin 1 gram IV q 24 hr s   2.  Await blood and urine cx    3. CBC with diff    4.  D/C on oral                       Sepsis and Other Febrile Illness, without Focal Infection - Care Day 3 (12/28/2019) by Arron Sena RN         Review Status Review Entered   Completed 12/30/2019 16:29       Criteria Review      Care Day: 3 Care Date: 12/28/2019 Level of Care:    Guideline Day 3    Clinical Status    (X) * Mental status at baseline    ( ) * Fever absent or reduced    Routes    ( ) * Oral hydration    * Milestone

## 2020-01-16 ENCOUNTER — ANESTHESIA (OUTPATIENT)
Dept: OPERATING ROOM | Age: 70
End: 2020-01-16
Payer: MEDICARE

## 2020-01-16 ENCOUNTER — ANESTHESIA EVENT (OUTPATIENT)
Dept: OPERATING ROOM | Age: 70
End: 2020-01-16
Payer: MEDICARE

## 2020-01-16 ENCOUNTER — HOSPITAL ENCOUNTER (OUTPATIENT)
Age: 70
Setting detail: OUTPATIENT SURGERY
Discharge: HOME | End: 2020-01-16
Attending: UROLOGY | Admitting: UROLOGY
Payer: MEDICARE

## 2020-01-16 VITALS — DIASTOLIC BLOOD PRESSURE: 73 MMHG | SYSTOLIC BLOOD PRESSURE: 118 MMHG | OXYGEN SATURATION: 99 %

## 2020-01-16 VITALS
HEART RATE: 74 BPM | SYSTOLIC BLOOD PRESSURE: 148 MMHG | WEIGHT: 214 LBS | OXYGEN SATURATION: 96 % | TEMPERATURE: 98.7 F | HEIGHT: 70 IN | RESPIRATION RATE: 20 BRPM | DIASTOLIC BLOOD PRESSURE: 88 MMHG | BODY MASS INDEX: 30.64 KG/M2

## 2020-01-16 LAB — PROSTATE SPECIFIC ANTIGEN: 8.46 NG/ML (ref 0–4)

## 2020-01-16 PROCEDURE — 7100000001 HC PACU RECOVERY - ADDTL 15 MIN: Performed by: UROLOGY

## 2020-01-16 PROCEDURE — C1769 GUIDE WIRE: HCPCS | Performed by: UROLOGY

## 2020-01-16 PROCEDURE — G0103 PSA SCREENING: HCPCS

## 2020-01-16 PROCEDURE — 3700000000 HC ANESTHESIA ATTENDED CARE: Performed by: UROLOGY

## 2020-01-16 PROCEDURE — 7100000010 HC PHASE II RECOVERY - FIRST 15 MIN: Performed by: UROLOGY

## 2020-01-16 PROCEDURE — 6360000002 HC RX W HCPCS: Performed by: ANESTHESIOLOGY

## 2020-01-16 PROCEDURE — C2617 STENT, NON-COR, TEM W/O DEL: HCPCS | Performed by: UROLOGY

## 2020-01-16 PROCEDURE — 6360000002 HC RX W HCPCS: Performed by: UROLOGY

## 2020-01-16 PROCEDURE — C1758 CATHETER, URETERAL: HCPCS | Performed by: UROLOGY

## 2020-01-16 PROCEDURE — 2709999900 HC NON-CHARGEABLE SUPPLY: Performed by: UROLOGY

## 2020-01-16 PROCEDURE — 6360000002 HC RX W HCPCS: Performed by: NURSE ANESTHETIST, CERTIFIED REGISTERED

## 2020-01-16 PROCEDURE — 7100000000 HC PACU RECOVERY - FIRST 15 MIN: Performed by: UROLOGY

## 2020-01-16 PROCEDURE — 3600000003 HC SURGERY LEVEL 3 BASE: Performed by: UROLOGY

## 2020-01-16 PROCEDURE — 87088 URINE BACTERIA CULTURE: CPT

## 2020-01-16 PROCEDURE — 7100000011 HC PHASE II RECOVERY - ADDTL 15 MIN: Performed by: UROLOGY

## 2020-01-16 PROCEDURE — 2580000003 HC RX 258: Performed by: UROLOGY

## 2020-01-16 PROCEDURE — 3700000001 HC ADD 15 MINUTES (ANESTHESIA): Performed by: UROLOGY

## 2020-01-16 PROCEDURE — 3600000013 HC SURGERY LEVEL 3 ADDTL 15MIN: Performed by: UROLOGY

## 2020-01-16 PROCEDURE — 36415 COLL VENOUS BLD VENIPUNCTURE: CPT

## 2020-01-16 DEVICE — URETERAL STENT
Type: IMPLANTABLE DEVICE | Status: FUNCTIONAL
Brand: PERCUFLEX™

## 2020-01-16 RX ORDER — FENTANYL CITRATE 50 UG/ML
INJECTION, SOLUTION INTRAMUSCULAR; INTRAVENOUS PRN
Status: DISCONTINUED | OUTPATIENT
Start: 2020-01-16 | End: 2020-01-16 | Stop reason: SDUPTHER

## 2020-01-16 RX ORDER — CEFAZOLIN SODIUM 2 G/50ML
2 SOLUTION INTRAVENOUS
Status: COMPLETED | OUTPATIENT
Start: 2020-01-16 | End: 2020-01-16

## 2020-01-16 RX ORDER — MEPERIDINE HYDROCHLORIDE 50 MG/ML
12.5 INJECTION INTRAMUSCULAR; INTRAVENOUS; SUBCUTANEOUS EVERY 5 MIN PRN
Status: DISCONTINUED | OUTPATIENT
Start: 2020-01-16 | End: 2020-01-16 | Stop reason: HOSPADM

## 2020-01-16 RX ORDER — LIDOCAINE HYDROCHLORIDE 20 MG/ML
INJECTION, SOLUTION INTRAVENOUS PRN
Status: DISCONTINUED | OUTPATIENT
Start: 2020-01-16 | End: 2020-01-16 | Stop reason: SDUPTHER

## 2020-01-16 RX ORDER — MIDAZOLAM HYDROCHLORIDE 1 MG/ML
INJECTION INTRAMUSCULAR; INTRAVENOUS PRN
Status: DISCONTINUED | OUTPATIENT
Start: 2020-01-16 | End: 2020-01-16 | Stop reason: SDUPTHER

## 2020-01-16 RX ORDER — PROPOFOL 10 MG/ML
INJECTION, EMULSION INTRAVENOUS PRN
Status: DISCONTINUED | OUTPATIENT
Start: 2020-01-16 | End: 2020-01-16 | Stop reason: SDUPTHER

## 2020-01-16 RX ORDER — ONDANSETRON 2 MG/ML
INJECTION INTRAMUSCULAR; INTRAVENOUS PRN
Status: DISCONTINUED | OUTPATIENT
Start: 2020-01-16 | End: 2020-01-16 | Stop reason: SDUPTHER

## 2020-01-16 RX ORDER — PHENAZOPYRIDINE HYDROCHLORIDE 100 MG/1
100 TABLET, FILM COATED ORAL 3 TIMES DAILY PRN
Qty: 30 TABLET | Refills: 0 | Status: SHIPPED | OUTPATIENT
Start: 2020-01-16 | End: 2020-01-26

## 2020-01-16 RX ORDER — SODIUM CHLORIDE 9 MG/ML
INJECTION, SOLUTION INTRAVENOUS CONTINUOUS
Status: DISCONTINUED | OUTPATIENT
Start: 2020-01-16 | End: 2020-01-16 | Stop reason: HOSPADM

## 2020-01-16 RX ORDER — IBUPROFEN 400 MG/1
800 TABLET ORAL EVERY 6 HOURS PRN
Status: DISCONTINUED | OUTPATIENT
Start: 2020-01-16 | End: 2020-01-16 | Stop reason: HOSPADM

## 2020-01-16 RX ORDER — CIPROFLOXACIN 250 MG/1
250 TABLET, FILM COATED ORAL 2 TIMES DAILY
Qty: 6 TABLET | Refills: 0 | Status: SHIPPED | OUTPATIENT
Start: 2020-01-16 | End: 2020-01-19

## 2020-01-16 RX ORDER — ONDANSETRON 2 MG/ML
4 INJECTION INTRAMUSCULAR; INTRAVENOUS EVERY 6 HOURS PRN
Status: DISCONTINUED | OUTPATIENT
Start: 2020-01-16 | End: 2020-01-16 | Stop reason: HOSPADM

## 2020-01-16 RX ORDER — IBUPROFEN 800 MG/1
800 TABLET ORAL EVERY 6 HOURS PRN
Qty: 30 TABLET | Refills: 1 | Status: SHIPPED | OUTPATIENT
Start: 2020-01-16 | End: 2022-05-24 | Stop reason: ALTCHOICE

## 2020-01-16 RX ORDER — ONDANSETRON 2 MG/ML
4 INJECTION INTRAMUSCULAR; INTRAVENOUS
Status: DISCONTINUED | OUTPATIENT
Start: 2020-01-16 | End: 2020-01-16 | Stop reason: HOSPADM

## 2020-01-16 RX ORDER — PHENAZOPYRIDINE HYDROCHLORIDE 100 MG/1
100 TABLET, FILM COATED ORAL 3 TIMES DAILY PRN
Status: DISCONTINUED | OUTPATIENT
Start: 2020-01-16 | End: 2020-01-16 | Stop reason: HOSPADM

## 2020-01-16 RX ADMIN — FENTANYL CITRATE 50 MCG: 50 INJECTION, SOLUTION INTRAMUSCULAR; INTRAVENOUS at 15:00

## 2020-01-16 RX ADMIN — ONDANSETRON HYDROCHLORIDE 4 MG: 2 INJECTION, SOLUTION INTRAMUSCULAR; INTRAVENOUS at 15:02

## 2020-01-16 RX ADMIN — FENTANYL CITRATE 25 MCG: 50 INJECTION, SOLUTION INTRAMUSCULAR; INTRAVENOUS at 13:59

## 2020-01-16 RX ADMIN — LIDOCAINE HYDROCHLORIDE 100 MG: 20 INJECTION, SOLUTION INTRAVENOUS at 13:59

## 2020-01-16 RX ADMIN — SODIUM CHLORIDE: 9 INJECTION, SOLUTION INTRAVENOUS at 11:24

## 2020-01-16 RX ADMIN — CEFAZOLIN SODIUM 2 G: 2 SOLUTION INTRAVENOUS at 14:03

## 2020-01-16 RX ADMIN — PROPOFOL 200 MG: 10 INJECTION, EMULSION INTRAVENOUS at 13:59

## 2020-01-16 RX ADMIN — MIDAZOLAM 2 MG: 1 INJECTION INTRAMUSCULAR; INTRAVENOUS at 13:55

## 2020-01-16 RX ADMIN — FENTANYL CITRATE 50 MCG: 50 INJECTION, SOLUTION INTRAMUSCULAR; INTRAVENOUS at 14:35

## 2020-01-16 RX ADMIN — MEPERIDINE HYDROCHLORIDE 12.5 MG: 50 INJECTION INTRAMUSCULAR; INTRAVENOUS; SUBCUTANEOUS at 15:40

## 2020-01-16 RX ADMIN — FENTANYL CITRATE 50 MCG: 50 INJECTION, SOLUTION INTRAMUSCULAR; INTRAVENOUS at 14:10

## 2020-01-16 RX ADMIN — FENTANYL CITRATE 25 MCG: 50 INJECTION, SOLUTION INTRAMUSCULAR; INTRAVENOUS at 14:07

## 2020-01-16 ASSESSMENT — PULMONARY FUNCTION TESTS
PIF_VALUE: 13
PIF_VALUE: 13
PIF_VALUE: 16
PIF_VALUE: 1
PIF_VALUE: 16
PIF_VALUE: 1
PIF_VALUE: 16
PIF_VALUE: 1
PIF_VALUE: 11
PIF_VALUE: 16
PIF_VALUE: 13
PIF_VALUE: 16
PIF_VALUE: 16
PIF_VALUE: 1
PIF_VALUE: 16
PIF_VALUE: 16
PIF_VALUE: 9
PIF_VALUE: 3
PIF_VALUE: 16
PIF_VALUE: 9
PIF_VALUE: 13
PIF_VALUE: 16
PIF_VALUE: 5
PIF_VALUE: 16
PIF_VALUE: 15
PIF_VALUE: 5
PIF_VALUE: 16
PIF_VALUE: 5
PIF_VALUE: 16
PIF_VALUE: 6
PIF_VALUE: 13
PIF_VALUE: 16
PIF_VALUE: 17
PIF_VALUE: 16
PIF_VALUE: 16
PIF_VALUE: 9
PIF_VALUE: 16
PIF_VALUE: 1
PIF_VALUE: 6
PIF_VALUE: 13
PIF_VALUE: 13
PIF_VALUE: 16
PIF_VALUE: 3
PIF_VALUE: 3
PIF_VALUE: 16
PIF_VALUE: 13
PIF_VALUE: 16
PIF_VALUE: 16
PIF_VALUE: 15
PIF_VALUE: 13
PIF_VALUE: 16
PIF_VALUE: 16
PIF_VALUE: 2
PIF_VALUE: 13
PIF_VALUE: 16
PIF_VALUE: 15
PIF_VALUE: 8
PIF_VALUE: 16

## 2020-01-16 ASSESSMENT — PAIN SCALES - GENERAL
PAINLEVEL_OUTOF10: 0

## 2020-01-16 ASSESSMENT — PAIN - FUNCTIONAL ASSESSMENT: PAIN_FUNCTIONAL_ASSESSMENT: 0-10

## 2020-01-16 NOTE — PROGRESS NOTES
Patient given discharge instructions & verbalized understanding. Wife present. Patient given strainers for urine.

## 2020-01-16 NOTE — ANESTHESIA PRE PROCEDURE
auscultation                             Cardiovascular:        (-) hypertension    ECG reviewed  Rhythm: regular  Rate: normal                    Neuro/Psych:                ROS comment: Post polio syndrome GI/Hepatic/Renal:        (-) hepatitis and liver disease       Endo/Other:        (-) diabetes mellitus, hypothyroidism               Abdominal:           Vascular: negative vascular ROS. Anesthesia Plan      general     ASA 2       Induction: intravenous. MIPS: Prophylactic antiemetics administered. Anesthetic plan and risks discussed with patient. Plan discussed with CRNA.                   Minerva Spencer MD   1/16/2020

## 2020-01-16 NOTE — OP NOTE
prominence of the middle lobe with partial outlet obstruction. There was slight bilateral lobar hypertrophy. Panendoscopic evaluation of the bladder showed no clots stones tumors or foreign bodies. The left ureteral orifice was somewhat difficult to find but otherwise both were normal in size and low location and there was clear reflux of urine from each. A sensor wire was inserted through the scope and into the left ureteral orifice. I had trouble passing this beyond the stone. This wire was removed and an angled zip wire was inserted. This too was passed easily up to the level of stone but was not been able to be passed beyond it due to what appears to be tortuosity of the ureter. The stone appears to have been impacted for some time. A 5 Indonesian open-ended catheter was passed over this up to the level of stone. Despite this I was still not able to advance the wire beyond the stone into the kidney. I passed the sensor wire up to the level stone and it coiled just beneath it. The ureteral catheter and cystoscope were removed. A 6 Indonesian semirigid ureteroscope was advanced per urethra and into the bladder over the sensor wire. High-pressure saline irrigation was utilized. I passed the ureteroscope into the left ureter and up to the mid ureter where the ureter was very tortuous. I could see a small perforation. I was able to navigate the wire at this point through the tortuous ureter and beyond the stone and into the left renal pelvis. This straightened out the ureter and also effectively push the stone back into the kidney. Keeping the wire in position, the ureteroscope was removed. Prior to doing so, contrast was instilled and a left retrograde pyelogram was performed. The ureter was very tortuous and dilated. There was significant hydronephrosis of the left kidney. There was a small microperforation with contrast extravasation from the mid ureter but the ureter was otherwise intact.   No other stones were identified. After the ureteroscope was removed, the cystoscope was reassembled and advanced per urethra and into the bladder over the wire. A 6 Haitian by 28 cm JJ ureteral stent was passed over the wire and into the left renal pelvis. The wire was removed. Fluoroscopy confirmed coiling the stent proximally in the left renal pelvis and it was visualized to coil distally in the bladder. The stone, now located within the left kidney, was then easily located within the F2 focal point of the lithotripsy unit. The stone received a total of 3000 shocks at a maximum energy level of 6 kV and seemed to fragment decently. He tolerated procedure well, was extubated, was taken to the PACU in stable condition. He was discharged home with prescriptions for ibuprofen, Pyridium, Cipro. He will follow-up with a KUB in 1 to 2 weeks for potential office cystoscopy and stent removal.  His PSA will need to be monitored closely. If it does not improve, a prostate biopsy will need to be considered to rule out prostate cancer.       Odalis Howard MD  1/16/2020  4:19 PM

## 2020-01-18 LAB — URINE CULTURE, ROUTINE: NORMAL

## 2020-03-09 ENCOUNTER — HOSPITAL ENCOUNTER (OUTPATIENT)
Age: 70
Discharge: HOME | End: 2020-03-11
Payer: MEDICARE

## 2020-03-09 LAB — PROSTATE SPECIFIC ANTIGEN: 6.14 NG/ML (ref 0–4)

## 2020-03-09 PROCEDURE — 84153 ASSAY OF PSA TOTAL: CPT

## 2020-06-08 ENCOUNTER — HOSPITAL ENCOUNTER (OUTPATIENT)
Age: 70
Discharge: HOME | End: 2020-06-10
Payer: MEDICARE

## 2020-06-08 LAB — PROSTATE SPECIFIC ANTIGEN: 7.88 NG/ML (ref 0–4)

## 2020-06-08 PROCEDURE — 84153 ASSAY OF PSA TOTAL: CPT

## 2021-02-21 ENCOUNTER — IMMUNIZATION (OUTPATIENT)
Dept: PRIMARY CARE CLINIC | Age: 71
End: 2021-02-21
Payer: MEDICARE

## 2021-02-21 PROCEDURE — 91300 COVID-19, PFIZER VACCINE 30MCG/0.3ML DOSE: CPT | Performed by: NURSE PRACTITIONER

## 2021-02-21 PROCEDURE — 0001A COVID-19, PFIZER VACCINE 30MCG/0.3ML DOSE: CPT | Performed by: NURSE PRACTITIONER

## 2021-03-17 ENCOUNTER — IMMUNIZATION (OUTPATIENT)
Dept: PRIMARY CARE CLINIC | Age: 71
End: 2021-03-17
Payer: MEDICARE

## 2021-03-17 PROCEDURE — 91300 COVID-19, PFIZER VACCINE 30MCG/0.3ML DOSE: CPT | Performed by: PHYSICIAN ASSISTANT

## 2021-03-17 PROCEDURE — 0002A COVID-19, PFIZER VACCINE 30MCG/0.3ML DOSE: CPT | Performed by: PHYSICIAN ASSISTANT

## 2022-05-24 ENCOUNTER — OFFICE VISIT (OUTPATIENT)
Dept: PRIMARY CARE CLINIC | Age: 72
End: 2022-05-24
Payer: MEDICARE

## 2022-05-24 VITALS
HEIGHT: 68 IN | HEART RATE: 53 BPM | WEIGHT: 210 LBS | BODY MASS INDEX: 31.83 KG/M2 | TEMPERATURE: 97.2 F | SYSTOLIC BLOOD PRESSURE: 132 MMHG | DIASTOLIC BLOOD PRESSURE: 84 MMHG | OXYGEN SATURATION: 98 %

## 2022-05-24 DIAGNOSIS — N20.0 NEPHROLITHIASIS: ICD-10-CM

## 2022-05-24 DIAGNOSIS — I25.10 CORONARY ARTERY DISEASE INVOLVING NATIVE HEART WITHOUT ANGINA PECTORIS, UNSPECIFIED VESSEL OR LESION TYPE: Primary | ICD-10-CM

## 2022-05-24 PROCEDURE — 99203 OFFICE O/P NEW LOW 30 MIN: CPT | Performed by: INTERNAL MEDICINE

## 2022-05-24 RX ORDER — APIXABAN 5 MG/1
TABLET, FILM COATED ORAL
COMMUNITY
Start: 2022-03-25

## 2022-05-24 RX ORDER — SACUBITRIL AND VALSARTAN 24; 26 MG/1; MG/1
TABLET, FILM COATED ORAL
COMMUNITY
Start: 2022-05-05

## 2022-05-24 RX ORDER — METOPROLOL SUCCINATE 25 MG/1
TABLET, EXTENDED RELEASE ORAL
COMMUNITY
Start: 2022-05-05

## 2022-05-24 SDOH — ECONOMIC STABILITY: FOOD INSECURITY: WITHIN THE PAST 12 MONTHS, YOU WORRIED THAT YOUR FOOD WOULD RUN OUT BEFORE YOU GOT MONEY TO BUY MORE.: NEVER TRUE

## 2022-05-24 SDOH — ECONOMIC STABILITY: FOOD INSECURITY: WITHIN THE PAST 12 MONTHS, THE FOOD YOU BOUGHT JUST DIDN'T LAST AND YOU DIDN'T HAVE MONEY TO GET MORE.: NEVER TRUE

## 2022-05-24 ASSESSMENT — PATIENT HEALTH QUESTIONNAIRE - PHQ9
1. LITTLE INTEREST OR PLEASURE IN DOING THINGS: 0
SUM OF ALL RESPONSES TO PHQ9 QUESTIONS 1 & 2: 0
SUM OF ALL RESPONSES TO PHQ QUESTIONS 1-9: 0
2. FEELING DOWN, DEPRESSED OR HOPELESS: 0

## 2022-05-24 ASSESSMENT — SOCIAL DETERMINANTS OF HEALTH (SDOH): HOW HARD IS IT FOR YOU TO PAY FOR THE VERY BASICS LIKE FOOD, HOUSING, MEDICAL CARE, AND HEATING?: NOT HARD AT ALL

## 2022-05-24 NOTE — PROGRESS NOTES
5/24/22    Walker Fleming, a male of 74 y.o. came to the office    HPI     Walker Fleming presents today as a new patient. His medications include Entresto metoprolol Eliquis. He has a history of Polio --- he does wear a R leg brace. He has post polio syndrome. He has a history of kidney stones that led to sepsis. He still has joint pain after his bout of sepsis. He was diagnosed with CAD ----he is going to follow up with the [facility]. Family History  Aunt's/ Uncle - CAD  Mom - enlarged heart     Social History  Occupation- [employer]  Tobacco - Former    /84   Pulse 53   Temp 97.2 °F (36.2 °C)   Ht 5' 8\" (1.727 m)   Wt 210 lb (95.3 kg)   SpO2 98%   BMI 31.93 kg/m²     No Known Allergies    Current Outpatient Medications on File Prior to Visit   Medication Sig Dispense Refill    ENTRESTO 24-26 MG per tablet TAKE 1 TABLET BY MOUTH TWICE A DAY FOR 30 DAYS      metoprolol succinate (TOPROL XL) 25 MG extended release tablet TAKE 1 TABLET BY MOUTH TWICE PER DAY 8A/8P FOR 30 DAYS      ELIQUIS 5 MG TABS tablet TAKE 1 TABLET BY MOUTH TWICE PER DAY FOR 90 DAYS      Multiple Vitamins-Minerals (MULTIVITAMIN MEN 50+ PO) Take by mouth daily      Glucosamine-Chondroitin (GLUCOSAMINE CHONDR COMPLEX PO) Take by mouth daily      vitamin D (ERGOCALCIFEROL) 1.25 MG (03160 UT) CAPS capsule Take 50,000 Units by mouth once a week       No current facility-administered medications on file prior to visit. Review of Systems   Constitutional: Negative for activity change, appetite change, chills and fatigue. HENT: Negative for hearing loss, Negative for congestion. Respiratory: Negative for chest tightness, shortness of breath and wheezing. Cardiovascular: Negative for leg swelling Negative for chest pain and palpitations. Gastrointestinal: Negative for abdominal pain, Negative for abdominal distention, constipation and diarrhea.    Genitourinary: Negative for difficulty urinating, dysuria and frequency. Musculoskeletal: Negative for arthralgias, back pain, gait problem and joint swelling. Skin: Negative for color change rash or wound     Neurological: Negative for dizziness, seizures and headaches. Hematological: Negative for adenopathy. Does not bruise/bleed easily. Psychiatric/Behavioral: Negative for agitation, behavioral problems, confusion and decreased concentration. OBJECTIVE:    Physical Exam   Constitutional: Oriented to person, place, and time. Appears well-developed and well-nourished. No distress. HENT:   Head: Normocephalic and atraumatic. Eyes: Pupils are equal, round, and reactive to light. EOM are normal.   Neck: Neck supple. No JVD present. No tracheal deviation present. No thyromegaly present. Cardiovascular: Normal rate, regular rhythm, normal heart sounds and intact distal pulses. Exam reveals no gallop and no friction rub. No murmur heard. Pulmonary/Chest: Effort normal and breath sounds normal. No stridor. No respiratory distress. No wheezes or rales. Abdominal: Soft. Bowel sounds are normal. There is no distension nor mass. There is no tenderness. There is no guarding. Musculoskeletal: No edema tenderness or deformity  Neurological: Alert and oriented to person, place, and time. No cranial nerve deficit. Normal muscle tone. Coordination normal.   Skin: Skin is warm and dry. No diaphoresis. No erythema. Psychiatric: Normal mood and affect. Behavior is normal.     ASSESSMENT AND PLAN:    Laith Alaniz was seen today for established new doctor. Diagnoses and all orders for this visit:    Coronary artery disease involving native heart without angina pectoris, unspecified vessel or lesion type    Nephrolithiasis        Plan    1. Following with Cardiology for CAD --- he is seeking a second opinion  2. Diagnosed with a cardiomyopathy  3. Would recommend repeat blood work ---- he would like to defer now   4.  Start zyrtec for allergies     Return in about 4 months (around 9/24/2022). Electronically signed by Gianfranco Patton DO on 5/24/2022 at 11:38 AM          Please note that the above documentation was prepared using voice recognition software. Every attempt was made to ensure accuracy but there may be spelling, grammatical, and contextual errors.   Gianfranco Patton DO

## 2022-09-26 LAB
ALT SERPL-CCNC: 19 U/L (ref 0–40)
CHOLESTEROL, TOTAL: 122 MG/DL (ref 0–199)
HDLC SERPL-MCNC: 39 MG/DL
LDL CHOLESTEROL CALCULATED: 74 MG/DL (ref 0–99)
TRIGL SERPL-MCNC: 44 MG/DL (ref 0–149)
VLDLC SERPL CALC-MCNC: 9 MG/DL

## 2022-10-24 ENCOUNTER — OFFICE VISIT (OUTPATIENT)
Dept: PRIMARY CARE CLINIC | Age: 72
End: 2022-10-24
Payer: MEDICARE

## 2022-10-24 VITALS
TEMPERATURE: 97.4 F | DIASTOLIC BLOOD PRESSURE: 80 MMHG | HEIGHT: 68 IN | WEIGHT: 217 LBS | BODY MASS INDEX: 32.89 KG/M2 | OXYGEN SATURATION: 98 % | HEART RATE: 61 BPM | SYSTOLIC BLOOD PRESSURE: 122 MMHG | RESPIRATION RATE: 14 BRPM

## 2022-10-24 DIAGNOSIS — Z00.00 INITIAL MEDICARE ANNUAL WELLNESS VISIT: Primary | ICD-10-CM

## 2022-10-24 PROCEDURE — G0439 PPPS, SUBSEQ VISIT: HCPCS | Performed by: INTERNAL MEDICINE

## 2022-10-24 PROCEDURE — 1123F ACP DISCUSS/DSCN MKR DOCD: CPT | Performed by: INTERNAL MEDICINE

## 2022-10-24 RX ORDER — VALSARTAN 40 MG/1
TABLET ORAL
COMMUNITY
Start: 2022-09-12

## 2022-10-24 RX ORDER — ATORVASTATIN CALCIUM 40 MG/1
80 TABLET, FILM COATED ORAL
COMMUNITY
Start: 2022-09-27

## 2022-10-24 RX ORDER — ERGOCALCIFEROL 1.25 MG/1
50000 CAPSULE ORAL WEEKLY
Qty: 4 CAPSULE | Refills: 5 | Status: SHIPPED | OUTPATIENT
Start: 2022-10-24

## 2022-10-24 ASSESSMENT — PATIENT HEALTH QUESTIONNAIRE - PHQ9
SUM OF ALL RESPONSES TO PHQ9 QUESTIONS 1 & 2: 0
SUM OF ALL RESPONSES TO PHQ QUESTIONS 1-9: 0
2. FEELING DOWN, DEPRESSED OR HOPELESS: 0
1. LITTLE INTEREST OR PLEASURE IN DOING THINGS: 0
SUM OF ALL RESPONSES TO PHQ QUESTIONS 1-9: 0

## 2022-10-24 ASSESSMENT — LIFESTYLE VARIABLES
HOW OFTEN DO YOU HAVE A DRINK CONTAINING ALCOHOL: NEVER
HOW MANY STANDARD DRINKS CONTAINING ALCOHOL DO YOU HAVE ON A TYPICAL DAY: PATIENT DOES NOT DRINK

## 2022-10-24 NOTE — PROGRESS NOTES
Medicare Annual Wellness Visit    Lizzeth Subramanian is here for Medicare AWV    Assessment & Plan   Initial Medicare annual wellness visit    Recommendations for Preventive Services Due: see orders and patient instructions/AVS.  Recommended screening schedule for the next 5-10 years is provided to the patient in written form: see Patient Instructions/AVS.     Return for Medicare Annual Wellness Visit in 1 year. Subjective   The following acute and/or chronic problems were also addressed today:  See below      Patient's complete Health Risk Assessment and screening values have been reviewed and are found in Flowsheets. The following problems were reviewed today and where indicated follow up appointments were made and/or referrals ordered. Positive Risk Factor Screenings with Interventions:    Fall Risk:  Do you feel unsteady or are you worried about falling? : (!) yes  2 or more falls in past year?: no  Fall with injury in past year?: no   Fall Risk Interventions:    Home safety tips provided             Health Habits/Nutrition:  Physical Activity: Insufficiently Active    Days of Exercise per Week: 3 days    Minutes of Exercise per Session: 20 min     Have you lost any weight without trying in the past 3 months?: No  Body mass index: (!) 32.99  Have you seen the dentist within the past year?: Yes  Health Habits/Nutrition Interventions:  Nutritional issues:  educational materials for healthy, well-balanced diet provided             Objective   Vitals:    10/24/22 1021   BP: 122/80   Pulse: 61   Resp: 14   Temp: 97.4 °F (36.3 °C)   SpO2: 98%   Weight: 217 lb (98.4 kg)   Height: 5' 8\" (1.727 m)      Body mass index is 32.99 kg/m².       General Appearance: alert and oriented to person, place and time, well developed and well- nourished, in no acute distress  Skin: warm and dry, no rash or erythema  Head: normocephalic and atraumatic  Eyes: pupils equal, round, and reactive to light, extraocular eye movements intact, conjunctivae normal  ENT: tympanic membrane, external ear and ear canal normal bilaterally, nose without deformity, nasal mucosa and turbinates normal without polyps  Neck: supple and non-tender without mass, no thyromegaly or thyroid nodules, no cervical lymphadenopathy  Pulmonary/Chest: clear to auscultation bilaterally- no wheezes, rales or rhonchi, normal air movement, no respiratory distress  Cardiovascular: normal rate, regular rhythm, normal S1 and S2, no murmurs, rubs, clicks, or gallops, distal pulses intact, no carotid bruits  Abdomen: soft, non-tender, non-distended, normal bowel sounds, no masses or organomegaly  Extremities: no cyanosis, clubbing or edema  Musculoskeletal: normal range of motion, no joint swelling, deformity or tenderness  Neurologic: reflexes normal and symmetric, no cranial nerve deficit, gait, coordination and speech normal       No Known Allergies  Prior to Visit Medications    Medication Sig Taking?  Authorizing Provider   vitamin D (ERGOCALCIFEROL) 1.25 MG (93536 UT) CAPS capsule Take 1 capsule by mouth once a week Yes Kortney Arredondo DO   ENTRESTO 24-26 MG per tablet TAKE 1 TABLET BY MOUTH TWICE A DAY FOR 30 DAYS Yes Historical Provider, MD   metoprolol succinate (TOPROL XL) 25 MG extended release tablet TAKE 1 TABLET BY MOUTH TWICE PER DAY 8A/8P FOR 30 DAYS Yes Historical Provider, MD   ELIQUIS 5 MG TABS tablet TAKE 1 TABLET BY MOUTH TWICE PER DAY FOR 90 DAYS Yes Historical Provider, MD   Multiple Vitamins-Minerals (MULTIVITAMIN MEN 50+ PO) Take by mouth daily Yes Historical Provider, MD   Glucosamine-Chondroitin (GLUCOSAMINE CHONDR COMPLEX PO) Take by mouth daily Yes Historical Provider, MD   atorvastatin (LIPITOR) 40 MG tablet 80 mg  Historical Provider, MD   valsartan (DIOVAN) 40 MG tablet TAKE 1 TABLET BY MOUTH TWICE A DAY  Historical Provider, MD [place] (Including outside providers/suppliers regularly involved in providing care):   Patient Care Team:  Salty Larson DO as PCP - General (Internal Medicine)  Salty Larson DO as PCP - [facility] Empaneled Provider     Reviewed and updated this visit:  Tobacco  Allergies  Meds  Med Hx  Surg Hx  Soc Hx  Fam Hx        Last appointment he establish as a new patient. He was following with cardiology for coronary artery disease and cardiomyopathy. He was started on Zyrtec for allergies.       He is following with [facility] cardio  Going to have BW with outside provider

## 2022-10-24 NOTE — PATIENT INSTRUCTIONS
Personalized Preventive Plan for Antwon Parrish - 10/24/2022  Medicare offers a range of preventive health benefits. Some of the tests and screenings are paid in full while other may be subject to a deductible, co-insurance, and/or copay. Some of these benefits include a comprehensive review of your medical history including lifestyle, illnesses that may run in your family, and various assessments and screenings as appropriate. After reviewing your medical record and screening and assessments performed today your provider may have ordered immunizations, labs, imaging, and/or referrals for you. A list of these orders (if applicable) as well as your Preventive Care list are included within your After Visit Summary for your review. Other Preventive Recommendations:    A preventive eye exam performed by an eye specialist is recommended every 1-2 years to screen for glaucoma; cataracts, macular degeneration, and other eye disorders. A preventive dental visit is recommended every 6 months. Try to get at least 150 minutes of exercise per week or 10,000 steps per day on a pedometer . Order or download the FREE \"Exercise & Physical Activity: Your Everyday Guide\" from [employer]. Call 5-734.210.4518 or search [employer] online. You need 1467-9116 mg of calcium and 2870-3860 IU of vitamin D per day. It is possible to meet your calcium requirement with diet alone, but a vitamin D supplement is usually necessary to meet this goal.  When exposed to the sun, use a sunscreen that protects against both UVA and UVB radiation with an SPF of 30 or greater. Reapply every 2 to 3 hours or after sweating, drying off with a towel, or swimming. Always wear a seat belt when traveling in a car. Always wear a helmet when riding a bicycle or motorcycle. Patient Education        Learning About Low-Carbohydrate Diets  What is a low-carbohydrate diet?      A low-carbohydrate (or \"low-carb\") diet limits foods and drinks that have carbohydrates. This includes grains, fruits, milk and yogurt, and starchy vegetables like potatoes, beans, and corn. It also avoids foods and drinks that have added sugar. Instead, low-carb diets include foods that are high in protein and fat. Why might you follow a low-carb diet? Low-carb diets may be used for a variety of reasons, such as for weight loss. People who have diabetes may use a low-carb diet to help manage their blood sugar levels. What should you do before you start the diet? Talk to your doctor before you try any diet. This is even more important if you have health problems like kidney disease, heart disease, or diabetes. Your doctor may suggest that you meet with a registered dietitian. A dietitian can help you make an eating plan that works for you. What foods do you eat on a low-carb diet? On a low-carb diet, you choose foods that are high in protein and fat. Examples of these are:  Meat, poultry, and fish. Eggs. Nuts, such as walnuts, pecans, almonds, and peanuts. Peanut butter and other nut butters. Tofu. Avocado. [place]. Non-starchy vegetables like broccoli, cauliflower, green beans, mushrooms, peppers, lettuce, and spinach. Unsweetened non-dairy milks like almond milk and coconut milk. Cheese, cottage cheese, and cream cheese. Current as of: December 17, 2020               Content Version: 12.8  © 2006-2021 Healthwise, [employer]. Care instructions adapted under license by [facility] ([facility]). If you have questions about a medical condition or this instruction, always ask your healthcare professional. [place] [address] any warranty or liability for your use of this information. Patient Education        Eating Healthy Foods: Care Instructions  Your Care Instructions     Eating healthy foods can help lower your risk for disease.  Healthy food gives you energy and keeps your heart strong, your brain active, your muscles working, and your bones strong. A healthy diet includes a variety of foods from the basic food groups: grains, vegetables, fruits, milk and milk products, and meat and beans. Some people may eat more of their favorite foods from only one food group and, as a result, miss getting the nutrients they need. So, it is important to pay attention not only to what you eat but also to what you are missing from your diet. You can eat a healthy, balanced diet by making a few small changes. Follow-up care is a key part of your treatment and safety. Be sure to make and go to all appointments, and call your doctor if you are having problems. It's also a good idea to know your test results and keep a list of the medicines you take. How can you care for yourself at home? Look at what you eat  Keep a food diary for a week or two and record everything you eat or drink. Track the number of servings you eat from each food group. For a balanced diet every day, eat a variety of:  6 or more ounce-equivalents of grains, such as cereals, breads, crackers, rice, or pasta, every day. An ounce-equivalent is 1 slice of bread, 1 cup of ready-to-eat cereal, or ½ cup of cooked rice, cooked pasta, or cooked cereal.  2½ cups of vegetables, especially:  Dark-green vegetables such as broccoli and spinach. Orange vegetables such as carrots and sweet potatoes. Dry beans (such as garcía and kidney beans) and peas (such as lentils). 2 cups of fresh, frozen, or canned fruit. A small apple or 1 banana or orange equals 1 cup.  3 cups of nonfat or low-fat milk, yogurt, or other milk products. 5½ ounces of meat and beans, such as chicken, fish, lean meat, beans, nuts, and seeds. One egg, 1 tablespoon of peanut butter, ½ ounce nuts or seeds, or ¼ cup of cooked beans equals 1 ounce of meat. Learn how to read food labels for serving sizes and ingredients. Fast-food and convenience-food meals often contain few or no fruits or vegetables.  Make sure you eat some fruits and vegetables to make the meal more nutritious. Look at your food diary. For each food group, add up what you have eaten and then divide the total by the number of days. This will give you an idea of how much you are eating from each food group. See if you can find some ways to change your diet to make it more healthy. Start small  Do not try to make dramatic changes to your diet all at once. You might feel that you are missing out on your favorite foods and then be more likely to fail. Start slowly, and gradually change your habits. Try some of the following:  Use whole wheat bread instead of white bread. Use nonfat or low-fat milk instead of whole milk. Eat brown rice instead of white rice, and eat whole wheat pasta instead of white-flour pasta. Try low-fat cheeses and low-fat yogurt. Add more fruits and vegetables to meals and have them for snacks. Add lettuce, tomato, cucumber, and onion to sandwiches. Add fruit to yogurt and cereal.  Enjoy food  You can still eat your favorite foods. You just may need to eat less of them. If your favorite foods are high in fat, salt, and sugar, limit how often you eat them, but do not cut them out entirely. Eat a wide variety of foods. Make healthy choices when eating out  The type of restaurant you choose can help you make healthy choices. Even fast-food chains are now offering more low-fat or healthier choices on the menu. Choose smaller portions, or take half of your meal home. When eating out, try:  A veggie pizza with a whole wheat crust or grilled chicken (instead of sausage or pepperoni). Pasta with roasted vegetables, grilled chicken, or marinara sauce instead of cream sauce. A vegetable wrap or grilled chicken wrap. Broiled or poached food instead of fried or breaded items.   Make healthy choices easy  Buy packaged, prewashed, ready-to-eat fresh vegetables and fruits, such as baby carrots, salad mixes, and chopped or shredded broccoli and cauliflower. Buy packaged, presliced fruits, such as melon or pineapple. Choose 100% fruit or vegetable juice instead of soda. Limit juice intake to 4 to 6 oz (½ to ¾ cup) a day. Blend low-fat yogurt, fruit juice, and canned or frozen fruit to make a smoothie for breakfast or a snack. Where can you learn more? Go to https://AdventureDroppepicewPeopleJar.Masabi. org and sign in to your [employer] account. Enter B174 in the [employer] box to learn more about \"Eating Healthy Foods: Care Instructions. \"     If you do not have an account, please click on the \"Sign Up Now\" link. Current as of: December 17, 2020               Content Version: 12.8  © 2006-2021 Healthwise, [employer]. Care instructions adapted under license by [facility] ([facility]). If you have questions about a medical condition or this instruction, always ask your healthcare professional. [place] [address] any warranty or liability for your use of this information. Patient Education        Learning About Healthy Weight  What is a healthy weight? A healthy weight is the weight at which you feel good about yourself and have energy for work and play. It's also one that lowers your risk for health problems. What can you do to stay at a healthy weight? It can be hard to stay at a healthy weight, especially when fast food, vending-machine snacks, and processed foods are so easy to find. And with your busy lifestyle, activity may be low on your list of things to do. But staying at a healthy weight may be easier than you think. Here are some dos and don'ts for staying at a healthy weight. Do eat healthy foods  The kinds of foods you eat have a big impact on both your weight and your health. Reaching and staying at a healthy weight is not about going on a diet. It's about making healthier food choices every day and changing your diet for good.   Healthy eating means eating a variety of foods so that you get all the nutrients you need. Your body needs protein, carbohydrate, and fats for energy. They keep your heart beating, your brain active, and your muscles working. On most days, try to eat from each food group. This means eating a variety of: Whole grains, such as whole wheat breads and pastas. Fruits and vegetables. Dairy products, such as low-fat milk, yogurt, and cheese. Lean proteins, such as all types of fish, chicken without the skin, and beans. Don't have too much or too little of one thing. All foods, if eaten in moderation, can be part of healthy eating. Even sweets can be okay. If your favorite foods are high in fat, salt, sugar, or calories, limit how often you eat them. Eat smaller servings, or look for healthy substitutes. Do watch what you eat  Many people eat more than their bodies need. Part of staying at a healthy weight means learning how much food you really need from day to day and not eating more than that. Even with healthy foods, eating too much can make you gain weight. Having a well-balanced diet means that you eat enough, but not too much, and that your food gives you the nutrients you need to stay healthy. So listen to your body. Eat when you're hungry. Stop when you feel satisfied. It's a good idea to have healthy snacks ready for when you get hungry. Keep healthy snacks with you at work, in your car, and at home. If you have a healthy snack easily available, you'll be less likely to pick a candy bar or bag of chips from a vending machine instead. Some healthy snacks you might want to keep on hand are fruit, low-fat yogurt, string cheese, low-fat microwave popcorn, raisins and other dried fruit, nuts, whole wheat crackers, pretzels, carrots, celery sticks, and broccoli. Do some physical activity  A big part of reaching and staying at a healthy weight is being active. When you're active, you burn calories. This makes it easier to reach and stay at a healthy weight.  When you're active on a regular Instructions  Your Care Instructions     Getting around your home safely can be a challenge if you have injuries or health problems that make it easy for you to fall. Loose rugs and furniture in walkways are among the dangers for many older people who have problems walking or who have poor eyesight. People who have conditions such as arthritis, osteoporosis, or dementia also have to be careful not to fall. You can make your home safer with a few simple measures. Follow-up care is a key part of your treatment and safety. Be sure to make and go to all appointments, and call your doctor if you are having problems. It's also a good idea to know your test results and keep a list of the medicines you take. How can you care for yourself at home? Taking care of yourself  You may get dizzy if you do not drink enough water. To prevent dehydration, drink plenty of fluids. Choose water and other caffeine-free clear liquids. If you have kidney, heart, or liver disease and have to limit fluids, talk with your doctor before you increase the amount of fluids you drink. Exercise regularly to improve your strength, muscle tone, and balance. Walk if you can. Swimming may be a good choice if you cannot walk easily. Have your vision and hearing checked each year or any time you notice a change. If you have trouble seeing and hearing, you might not be able to avoid objects and could lose your balance. Know the side effects of the medicines you take. Ask your doctor or pharmacist whether the medicines you take can affect your balance. Sleeping pills or sedatives can affect your balance. Limit the amount of alcohol you drink. Alcohol can impair your balance and other senses. Ask your doctor whether calluses or corns on your feet need to be removed. If you wear loose-fitting shoes because of calluses or corns, you can lose your balance and fall. Talk to your doctor if you have numbness in your feet.   Preventing falls at home  Remove raised doorway thresholds, throw rugs, and clutter. Repair loose carpet or raised areas in the floor. Move furniture and electrical cords to keep them out of walking paths. Use nonskid floor wax, and wipe up spills right away, especially on ceramic tile floors. If you use a walker or cane, put rubber tips on it. If you use crutches, clean the bottoms of them regularly with an abrasive pad, such as steel wool. Keep your house well lit, especially stairways, porches, and outside walkways. Use night-lights in areas such as hallways and bathrooms. Add extra light switches or use remote switches (such as switches that go on or off when you clap your hands) to make it easier to turn lights on if you have to get up during the night. Install sturdy handrails on stairways. Move items in your cabinets so that the things you use a lot are on the lower shelves (about waist level). Keep a cordless phone and a flashlight with new batteries by your bed. If possible, put a phone in each of the main rooms of your house, or carry a cell phone in case you fall and cannot reach a phone. Or, you can wear a device around your neck or wrist. You push a button that sends a signal for help. Wear low-heeled shoes that fit well and give your feet good support. Use footwear with nonskid soles. Check the heels and soles of your shoes for wear. Repair or replace worn heels or soles. Do not wear socks without shoes on wood floors. Walk on the grass when the sidewalks are slippery. If you live in an area that gets snow and ice in the winter, sprinkle salt on slippery steps and sidewalks. Preventing falls in the bath  Install grab bars and nonskid mats inside and outside your shower or tub and near the toilet and sinks. Use shower chairs and bath benches. Use a hand-held shower head that will allow you to sit while showering.   Get into a tub or shower by putting the weaker leg in first. Get out of a tub or shower with your strong side first.  Repair loose toilet seats and consider installing a raised toilet seat to make getting on and off the toilet easier. Keep your bathroom door unlocked while you are in the shower. Where can you learn more? Go to https://chpesylvaineweb.Extension Entertainment. org and sign in to your FKK Corporationhart account. Enter [id number] [id number] [phone] in the [facility] box to learn more about \"Preventing Falls: Care Instructions. \"     If you do not have an account, please click on the \"Sign Up Now\" link. Current as of: December 7, 2020               Content Version: 12.8  © 9244-0044 Healthwise, Incorporated. Care instructions adapted under license by [facility] ([facility]). If you have questions about a medical condition or this instruction, always ask your healthcare professional. [address] any warranty or liability for your use of this information.

## 2022-12-15 LAB
CHOLESTEROL, TOTAL: 117 MG/DL (ref 0–199)
HDLC SERPL-MCNC: 42 MG/DL
LDL CHOLESTEROL CALCULATED: 66 MG/DL (ref 0–99)
TRIGL SERPL-MCNC: 47 MG/DL (ref 0–149)
VLDLC SERPL CALC-MCNC: 9 MG/DL

## 2023-03-09 ENCOUNTER — TELEPHONE (OUTPATIENT)
Dept: PRIMARY CARE CLINIC | Age: 73
End: 2023-03-09

## 2023-03-09 NOTE — TELEPHONE ENCOUNTER
Pt called and wanted to know if he can get a PSA order placed, wanted to get this done and read before his appointment in October.

## 2023-05-25 RX ORDER — ERGOCALCIFEROL 1.25 MG/1
50000 CAPSULE ORAL WEEKLY
Qty: 4 CAPSULE | Refills: 5 | Status: SHIPPED | OUTPATIENT
Start: 2023-05-25

## 2023-10-25 ENCOUNTER — OFFICE VISIT (OUTPATIENT)
Dept: PRIMARY CARE CLINIC | Age: 73
End: 2023-10-25
Payer: MEDICARE

## 2023-10-25 VITALS
WEIGHT: 216 LBS | SYSTOLIC BLOOD PRESSURE: 124 MMHG | OXYGEN SATURATION: 95 % | HEART RATE: 88 BPM | TEMPERATURE: 97.6 F | BODY MASS INDEX: 32.84 KG/M2 | DIASTOLIC BLOOD PRESSURE: 78 MMHG

## 2023-10-25 DIAGNOSIS — Z00.00 MEDICARE ANNUAL WELLNESS VISIT, SUBSEQUENT: Primary | ICD-10-CM

## 2023-10-25 PROCEDURE — 1123F ACP DISCUSS/DSCN MKR DOCD: CPT | Performed by: INTERNAL MEDICINE

## 2023-10-25 PROCEDURE — G0439 PPPS, SUBSEQ VISIT: HCPCS | Performed by: INTERNAL MEDICINE

## 2023-10-25 RX ORDER — ATORVASTATIN CALCIUM 80 MG/1
80 TABLET, FILM COATED ORAL DAILY
COMMUNITY
Start: 2023-10-02

## 2023-10-25 ASSESSMENT — PATIENT HEALTH QUESTIONNAIRE - PHQ9
SUM OF ALL RESPONSES TO PHQ QUESTIONS 1-9: 0
2. FEELING DOWN, DEPRESSED OR HOPELESS: 0
SUM OF ALL RESPONSES TO PHQ QUESTIONS 1-9: 0
SUM OF ALL RESPONSES TO PHQ QUESTIONS 1-9: 0
1. LITTLE INTEREST OR PLEASURE IN DOING THINGS: 0
SUM OF ALL RESPONSES TO PHQ9 QUESTIONS 1 & 2: 0
SUM OF ALL RESPONSES TO PHQ QUESTIONS 1-9: 0

## 2024-07-16 ENCOUNTER — OFFICE VISIT (OUTPATIENT)
Dept: PRIMARY CARE CLINIC | Age: 74
End: 2024-07-16
Payer: MEDICARE

## 2024-07-16 VITALS
SYSTOLIC BLOOD PRESSURE: 134 MMHG | DIASTOLIC BLOOD PRESSURE: 86 MMHG | OXYGEN SATURATION: 96 % | WEIGHT: 216 LBS | BODY MASS INDEX: 32.74 KG/M2 | HEART RATE: 60 BPM | HEIGHT: 68 IN

## 2024-07-16 DIAGNOSIS — E78.00 HYPERCHOLESTEROLEMIA: ICD-10-CM

## 2024-07-16 DIAGNOSIS — Z12.5 PROSTATE CANCER SCREENING: ICD-10-CM

## 2024-07-16 DIAGNOSIS — Z11.59 NEED FOR HEPATITIS C SCREENING TEST: ICD-10-CM

## 2024-07-16 DIAGNOSIS — Z00.00 MEDICARE ANNUAL WELLNESS VISIT, SUBSEQUENT: Primary | ICD-10-CM

## 2024-07-16 LAB
ALBUMIN: 4.2 G/DL (ref 3.5–5.2)
ALP BLD-CCNC: 114 U/L (ref 40–129)
ALT SERPL-CCNC: 18 U/L (ref 0–40)
ANION GAP SERPL CALCULATED.3IONS-SCNC: 11 MMOL/L (ref 7–16)
AST SERPL-CCNC: 27 U/L (ref 0–39)
BASOPHILS ABSOLUTE: 0.05 K/UL (ref 0–0.2)
BASOPHILS RELATIVE PERCENT: 1 % (ref 0–2)
BILIRUB SERPL-MCNC: 1.1 MG/DL (ref 0–1.2)
BUN BLDV-MCNC: 16 MG/DL (ref 6–23)
CALCIUM SERPL-MCNC: 9.5 MG/DL (ref 8.6–10.2)
CHLORIDE BLD-SCNC: 104 MMOL/L (ref 98–107)
CHOLESTEROL, TOTAL: 124 MG/DL
CO2: 27 MMOL/L (ref 22–29)
CREAT SERPL-MCNC: 1 MG/DL (ref 0.7–1.2)
EOSINOPHILS ABSOLUTE: 0.22 K/UL (ref 0.05–0.5)
EOSINOPHILS RELATIVE PERCENT: 3 % (ref 0–6)
GFR, ESTIMATED: 80 ML/MIN/1.73M2
GLUCOSE BLD-MCNC: 96 MG/DL (ref 74–99)
HCT VFR BLD CALC: 46 % (ref 37–54)
HDLC SERPL-MCNC: 46 MG/DL
HEMOGLOBIN: 14.9 G/DL (ref 12.5–16.5)
IMMATURE GRANULOCYTES %: 0 % (ref 0–5)
IMMATURE GRANULOCYTES ABSOLUTE: <0.03 K/UL (ref 0–0.58)
LDL CHOLESTEROL: 66 MG/DL
LYMPHOCYTES ABSOLUTE: 1.57 K/UL (ref 1.5–4)
LYMPHOCYTES RELATIVE PERCENT: 20 % (ref 20–42)
MCH RBC QN AUTO: 30.4 PG (ref 26–35)
MCHC RBC AUTO-ENTMCNC: 32.4 G/DL (ref 32–34.5)
MCV RBC AUTO: 93.9 FL (ref 80–99.9)
MONOCYTES ABSOLUTE: 0.66 K/UL (ref 0.1–0.95)
MONOCYTES RELATIVE PERCENT: 9 % (ref 2–12)
NEUTROPHILS ABSOLUTE: 5.28 K/UL (ref 1.8–7.3)
NEUTROPHILS RELATIVE PERCENT: 68 % (ref 43–80)
PDW BLD-RTO: 13.2 % (ref 11.5–15)
PLATELET # BLD: 186 K/UL (ref 130–450)
PMV BLD AUTO: 11.5 FL (ref 7–12)
POTASSIUM SERPL-SCNC: 5.1 MMOL/L (ref 3.5–5)
PROSTATE SPECIFIC ANTIGEN: 8.03 NG/ML (ref 0–4)
RBC # BLD: 4.9 M/UL (ref 3.8–5.8)
SODIUM BLD-SCNC: 142 MMOL/L (ref 132–146)
TOTAL PROTEIN: 7.4 G/DL (ref 6.4–8.3)
TRIGL SERPL-MCNC: 59 MG/DL
VLDLC SERPL CALC-MCNC: 12 MG/DL
WBC # BLD: 7.8 K/UL (ref 4.5–11.5)

## 2024-07-16 PROCEDURE — 1123F ACP DISCUSS/DSCN MKR DOCD: CPT | Performed by: INTERNAL MEDICINE

## 2024-07-16 PROCEDURE — 36415 COLL VENOUS BLD VENIPUNCTURE: CPT | Performed by: INTERNAL MEDICINE

## 2024-07-16 PROCEDURE — G0439 PPPS, SUBSEQ VISIT: HCPCS | Performed by: INTERNAL MEDICINE

## 2024-07-16 SDOH — ECONOMIC STABILITY: HOUSING INSECURITY
IN THE LAST 12 MONTHS, WAS THERE A TIME WHEN YOU DID NOT HAVE A STEADY PLACE TO SLEEP OR SLEPT IN A SHELTER (INCLUDING NOW)?: NO

## 2024-07-16 SDOH — ECONOMIC STABILITY: FOOD INSECURITY: WITHIN THE PAST 12 MONTHS, YOU WORRIED THAT YOUR FOOD WOULD RUN OUT BEFORE YOU GOT MONEY TO BUY MORE.: NEVER TRUE

## 2024-07-16 SDOH — ECONOMIC STABILITY: FOOD INSECURITY: WITHIN THE PAST 12 MONTHS, THE FOOD YOU BOUGHT JUST DIDN'T LAST AND YOU DIDN'T HAVE MONEY TO GET MORE.: NEVER TRUE

## 2024-07-16 SDOH — ECONOMIC STABILITY: INCOME INSECURITY: HOW HARD IS IT FOR YOU TO PAY FOR THE VERY BASICS LIKE FOOD, HOUSING, MEDICAL CARE, AND HEATING?: NOT HARD AT ALL

## 2024-07-16 ASSESSMENT — PATIENT HEALTH QUESTIONNAIRE - PHQ9
1. LITTLE INTEREST OR PLEASURE IN DOING THINGS: MORE THAN HALF THE DAYS
SUM OF ALL RESPONSES TO PHQ QUESTIONS 1-9: 7
6. FEELING BAD ABOUT YOURSELF - OR THAT YOU ARE A FAILURE OR HAVE LET YOURSELF OR YOUR FAMILY DOWN: SEVERAL DAYS
9. THOUGHTS THAT YOU WOULD BE BETTER OFF DEAD, OR OF HURTING YOURSELF: NOT AT ALL
2. FEELING DOWN, DEPRESSED OR HOPELESS: SEVERAL DAYS
SUM OF ALL RESPONSES TO PHQ QUESTIONS 1-9: 7
7. TROUBLE CONCENTRATING ON THINGS, SUCH AS READING THE NEWSPAPER OR WATCHING TELEVISION: NOT AT ALL
10. IF YOU CHECKED OFF ANY PROBLEMS, HOW DIFFICULT HAVE THESE PROBLEMS MADE IT FOR YOU TO DO YOUR WORK, TAKE CARE OF THINGS AT HOME, OR GET ALONG WITH OTHER PEOPLE: VERY DIFFICULT
4. FEELING TIRED OR HAVING LITTLE ENERGY: NEARLY EVERY DAY
5. POOR APPETITE OR OVEREATING: NOT AT ALL
8. MOVING OR SPEAKING SO SLOWLY THAT OTHER PEOPLE COULD HAVE NOTICED. OR THE OPPOSITE, BEING SO FIGETY OR RESTLESS THAT YOU HAVE BEEN MOVING AROUND A LOT MORE THAN USUAL: NOT AT ALL
SUM OF ALL RESPONSES TO PHQ QUESTIONS 1-9: 7
SUM OF ALL RESPONSES TO PHQ9 QUESTIONS 1 & 2: 3
3. TROUBLE FALLING OR STAYING ASLEEP: NOT AT ALL
SUM OF ALL RESPONSES TO PHQ QUESTIONS 1-9: 7

## 2024-07-16 NOTE — PROGRESS NOTES
7/16/24    Jean Carlos Simpson, a male of 74 y.o. presents today for Medicare AWV and Kidney Problem (Possible kidney infection? Had a kidney stone in 2019- having the same back aches he had in 2019 and is worried about another stone )      At last appointment:        Diagnoses and all orders for this visit:  Medicare annual wellness visit, subsequent  Hypercholesterolemia  -     Comprehensive Metabolic Panel  -     Lipid Panel  -     CBC with Auto Differential; Future  Prostate cancer screening  -     PSA Screening  Need for hepatitis C screening test  -     Hepatitis C Antibody; Future       Assessment and Plan  Rule out kidney stone with blood work, urinalysis unremarkable  Clinical exam favors low back pain  Consider trial of Flomax  Obtain routine blood work          Electronically signed by Spenser Arredondo DO on 7/16/2024 at 3:31 PM      Medicare Annual Wellness Visit    Jean Carlos Simpson is here for Medicare AWV and Kidney Problem (Possible kidney infection? Had a kidney stone in 2019- having the same back aches he had in 2019 and is worried about another stone )    Assessment & Plan     Recommendations for Preventive Services Due: see orders and patient instructions/AVS.  Recommended screening schedule for the next 5-10 years is provided to the patient in written form: see Patient Instructions/AVS.     Return for Medicare Annual Wellness Visit in 1 year.     Subjective       Patient's complete Health Risk Assessment and screening values have been reviewed and are found in Flowsheets. The following problems were reviewed today and where indicated follow up appointments were made and/or referrals ordered.    Positive Risk Factor Screenings with Interventions:    Fall Risk:  Do you feel unsteady or are you worried about falling? : (!) yes  2 or more falls in past year?: no  Fall with injury in past year?: no     Interventions:    Reviewed medications, home hazards, visual acuity, and co-morbidities that

## 2024-07-17 LAB — HEPATITIS C ANTIBODY: NONREACTIVE

## 2024-08-08 ENCOUNTER — TELEPHONE (OUTPATIENT)
Dept: PRIMARY CARE CLINIC | Age: 74
End: 2024-08-08

## 2024-08-08 NOTE — TELEPHONE ENCOUNTER
Pt called in stating that he thinks he has a kidney stone and wanted to know if you would order an ultrasound for him to get done?    Please advise.         Cell Phone [phone]

## 2024-08-12 ENCOUNTER — OFFICE VISIT (OUTPATIENT)
Dept: PRIMARY CARE CLINIC | Age: 74
End: 2024-08-12
Payer: MEDICARE

## 2024-08-12 VITALS
BODY MASS INDEX: 32.08 KG/M2 | WEIGHT: 211 LBS | SYSTOLIC BLOOD PRESSURE: 128 MMHG | HEART RATE: 72 BPM | TEMPERATURE: 98 F | OXYGEN SATURATION: 97 % | DIASTOLIC BLOOD PRESSURE: 80 MMHG

## 2024-08-12 DIAGNOSIS — M54.50 MIDLINE LOW BACK PAIN, UNSPECIFIED CHRONICITY, UNSPECIFIED WHETHER SCIATICA PRESENT: Primary | ICD-10-CM

## 2024-08-12 PROCEDURE — 99213 OFFICE O/P EST LOW 20 MIN: CPT | Performed by: INTERNAL MEDICINE

## 2024-08-12 PROCEDURE — 1123F ACP DISCUSS/DSCN MKR DOCD: CPT | Performed by: INTERNAL MEDICINE

## 2024-08-12 RX ORDER — TIZANIDINE 2 MG/1
2 TABLET ORAL 4 TIMES DAILY PRN
Qty: 40 TABLET | Refills: 0 | Status: SHIPPED | OUTPATIENT
Start: 2024-08-12

## 2024-08-12 RX ORDER — METHYLPREDNISOLONE 4 MG/1
TABLET ORAL
Qty: 1 KIT | Refills: 0 | Status: SHIPPED | OUTPATIENT
Start: 2024-08-12

## 2024-08-12 NOTE — PROGRESS NOTES
8/12/24    Jean Carlos Simpson, a male of 74 y.o. presents today for Lower Back Pain (For some time)    Diagnoses and all orders for this visit:  Midline low back pain, unspecified chronicity, unspecified whether sciatica present  Other orders  -     tiZANidine (ZANAFLEX) 2 MG tablet; Take 1 tablet by mouth 4 times daily as needed (back pain/ spasm)  -     methylPREDNISolone (MEDROL DOSEPACK) 4 MG tablet; Take by mouth.      Assessment and Plan  Start tizanidine and medrol  Consider CT of the abdomen if symptoms persist         Electronically signed by Spenser Arredondo DO on 8/12/2024 at 10:30 AM                          /80   Pulse 72   Temp 98 °F (36.7 °C)   Wt 95.7 kg (211 lb)   SpO2 97%   BMI 32.08 kg/m²     Review of Systems  Constitutional:Negative for activity change, appetite change, chills, fatigue and fever.   Respiratory: Negative for choking, chest tightness, shortness of breath and wheezing.  Cardiovascular: Negative for chest pain, palpitations and leg swelling.   Gastrointestinal: Negative for abdominal distention, constipation, diarrhea, nausea and vomiting.   Musculoskeletal: Negative for arthralgias, back pain, gait problem and joint swelling.   Neurological: Negative for dizziness, weakness,numbness and headaches.     Patient Active Problem List    Diagnosis Date Noted    Sepsis (HCC) 12/26/2019   No Known Allergies  Current Outpatient Medications on File Prior to Visit   Medication Sig Dispense Refill    atorvastatin (LIPITOR) 80 MG tablet Take 1 tablet by mouth daily      vitamin D (ERGOCALCIFEROL) 1.25 MG (93880 UT) CAPS capsule Take 1 capsule by mouth once a week 4 capsule 5    valsartan (DIOVAN) 40 MG tablet TAKE 1 TABLET BY MOUTH TWICE A DAY      ENTRESTO 24-26 MG per tablet TAKE 1 TABLET BY MOUTH TWICE A DAY FOR 30 DAYS      metoprolol succinate (TOPROL XL) 25 MG extended release tablet TAKE 1 TABLET BY MOUTH TWICE PER DAY 8A/8P FOR 30 DAYS      ELIQUIS 5 MG TABS tablet

## 2024-08-12 NOTE — PATIENT INSTRUCTIONS
Patient Education        Low Back Pain: Exercises  Introduction  Here are some examples of exercises for you to try. The exercises may be suggested for a condition or for rehabilitation. Start each exercise slowly. Ease off the exercises if you start to have pain.  You will be told when to start these exercises and which ones will work best for you.  How to do the exercises  Press-up   Lie on your stomach, supporting your body with your forearms.  Press your elbows down into the floor to raise your upper back. As you do this, relax your stomach muscles and allow your back to arch without using your back muscles. As your press up, do not let your hips or pelvis come off the floor.  Hold for 15 to 30 seconds, then relax.  Repeat 2 to 4 times.    Alternate arm and leg (bird dog) exercise   Do this exercise slowly. Try to keep your body straight at all times, and do not let one hip drop lower than the other.  Start on the floor, on your hands and knees.  Tighten your belly muscles.  Raise one leg off the floor, and hold it straight out behind you. Be careful not to let your hip drop down, because that will twist your trunk.  Hold for about 6 seconds, then lower your leg and switch to the other leg.  Repeat 8 to 12 times on each leg.  Over time, work up to holding for 10 to 30 seconds each time.  If you feel stable and secure with your leg raised, try raising the opposite arm straight out in front of you at the same time.    Knee-to-chest exercise   Lie on your back with your knees bent and your feet flat on the floor.  Bring one knee to your chest, keeping the other foot flat on the floor (or keeping the other leg straight, whichever feels better on your lower back).  Keep your lower back pressed to the floor. Hold for at least 15 to 30 seconds.  Relax, and lower the knee to the starting position.  Repeat with the other leg. Repeat 2 to 4 times with each leg.  To get more stretch, put your other leg flat on the floor

## 2024-10-30 ENCOUNTER — OFFICE VISIT (OUTPATIENT)
Dept: PRIMARY CARE CLINIC | Age: 74
End: 2024-10-30

## 2024-10-30 VITALS
HEIGHT: 68 IN | HEART RATE: 85 BPM | SYSTOLIC BLOOD PRESSURE: 130 MMHG | DIASTOLIC BLOOD PRESSURE: 80 MMHG | TEMPERATURE: 97 F | OXYGEN SATURATION: 97 % | BODY MASS INDEX: 32.58 KG/M2 | WEIGHT: 215 LBS

## 2024-10-30 DIAGNOSIS — E78.00 HYPERCHOLESTEROLEMIA: Primary | ICD-10-CM

## 2024-10-30 DIAGNOSIS — R97.20 ELEVATED PSA: ICD-10-CM

## 2024-10-30 DIAGNOSIS — E55.9 VITAMIN D DEFICIENCY: ICD-10-CM

## 2024-10-30 RX ORDER — ERGOCALCIFEROL 1.25 MG/1
50000 CAPSULE, LIQUID FILLED ORAL WEEKLY
Qty: 4 CAPSULE | Refills: 5 | Status: SHIPPED | OUTPATIENT
Start: 2024-10-30

## 2024-10-30 ASSESSMENT — PATIENT HEALTH QUESTIONNAIRE - PHQ9
SUM OF ALL RESPONSES TO PHQ QUESTIONS 1-9: 0
1. LITTLE INTEREST OR PLEASURE IN DOING THINGS: NOT AT ALL
SUM OF ALL RESPONSES TO PHQ QUESTIONS 1-9: 0
2. FEELING DOWN, DEPRESSED OR HOPELESS: NOT AT ALL
2. FEELING DOWN, DEPRESSED OR HOPELESS: NOT AT ALL
1. LITTLE INTEREST OR PLEASURE IN DOING THINGS: NOT AT ALL
SUM OF ALL RESPONSES TO PHQ QUESTIONS 1-9: 0
SUM OF ALL RESPONSES TO PHQ QUESTIONS 1-9: 0
SUM OF ALL RESPONSES TO PHQ9 QUESTIONS 1 & 2: 0
SUM OF ALL RESPONSES TO PHQ QUESTIONS 1-9: 0
SUM OF ALL RESPONSES TO PHQ9 QUESTIONS 1 & 2: 0
SUM OF ALL RESPONSES TO PHQ QUESTIONS 1-9: 0

## 2024-10-30 ASSESSMENT — LIFESTYLE VARIABLES
HOW MANY STANDARD DRINKS CONTAINING ALCOHOL DO YOU HAVE ON A TYPICAL DAY: PATIENT DOES NOT DRINK
HOW OFTEN DO YOU HAVE A DRINK CONTAINING ALCOHOL: NEVER

## 2024-10-30 NOTE — PROGRESS NOTES
10/30/24    Jean Carlos Simpson, a male of 74 y.o. presents today for routine follow up.     At last appointment, he was started on medrol and tizanidine for back pain. We discussed a CT of the abdomen if his issues persisted. He has been following with [facility] - Dr. Zimmerman. His last blood work showed elevated PSA --- he was advised to follow up with Urology.     Diagnoses and all orders for this visit:  Hypercholesterolemia  Elevated PSA  Vitamin D deficiency  Other orders  -     vitamin D (ERGOCALCIFEROL) 1.25 MG (71995 UT) CAPS capsule; Take 1 capsule by mouth once a week    Assessment and Plan   Following with Urology and Cardiology  Consider referral to Physical therapy for back pain  Continue vitamin D   He has been following with a chiropractor    Electronically signed by Spenser Arredondo DO on 10/30/2024 at 8:45 AM                          /80   Pulse 85   Temp 97 °F (36.1 °C)   Ht 1.727 m (5' 8\")   Wt 97.5 kg (215 lb)   SpO2 97%   BMI 32.69 kg/m²     Review of Systems  Constitutional:Negative for activity change, appetite change, chills, fatigue and fever.   Respiratory: Negative for choking, chest tightness, shortness of breath and wheezing.  Cardiovascular: Negative for chest pain, palpitations and leg swelling.   Gastrointestinal: Negative for abdominal distention, constipation, diarrhea, nausea and vomiting.   Musculoskeletal: Negative for arthralgias, back pain, gait problem and joint swelling.   Neurological: Negative for dizziness, weakness,numbness and headaches.     Patient Active Problem List    Diagnosis Date Noted    Sepsis (HCC) 12/26/2019   No Known Allergies  Current Outpatient Medications on File Prior to Visit   Medication Sig Dispense Refill    tiZANidine (ZANAFLEX) 2 MG tablet Take 1 tablet by mouth 4 times daily as needed (back pain/ spasm) 40 tablet 0    methylPREDNISolone (MEDROL DOSEPACK) 4 MG tablet Take by mouth. 1 kit 0    atorvastatin

## (undated) DEVICE — CATHETER URET 5FR L70CM OPN END SGL LUMN INJ HUB FLEXIMA

## (undated) DEVICE — SOLUTION IV IRRIG WATER 1000ML POUR BRL 2F7114

## (undated) DEVICE — CAMERA STRYKER 1488 HD GEN

## (undated) DEVICE — SEAL ENDOSCP INSTR 7FR BX SELF SEAL

## (undated) DEVICE — GOWN,SIRUS,FABRNF,XL,20/CS: Brand: MEDLINE

## (undated) DEVICE — GARMENT,MEDLINE,DVT,INT,CALF,MED, GEN2: Brand: MEDLINE

## (undated) DEVICE — GUIDEWIRE ENDOSCP L150CM DIA0.035IN TIP 3CM PTFE NIT

## (undated) DEVICE — CYSTO PACK: Brand: MEDLINE INDUSTRIES, INC.

## (undated) DEVICE — Z INACTIVE USE 2660664 SOLUTION IRRIG 3000ML 0.9% SOD CHL USP UROMATIC PLAS CONT

## (undated) DEVICE — Z INACTIVE USE 2635503 SOLUTION IRRIG 3000ML ST H2O USP UROMATIC PLAS CONT

## (undated) DEVICE — TRAY,SKIN SCRUB,DRY,W/GAUZE: Brand: MEDLINE

## (undated) DEVICE — BAG DRNGE COMB PK

## (undated) DEVICE — BAG DRAINAGE CONTAINER 15 LT FLUID COLLCTN

## (undated) DEVICE — MEDIA CONTRAST RX ISOVUE-300 61% 30ML VIALS

## (undated) DEVICE — GLOVE SURG SZ 75 STD WHT LTX SYN POLYMER BEAD REINF ANTI RL

## (undated) DEVICE — GUIDEWIRE URO L150CM DIA0.035IN TAPR 3CM NIT HYDRPHLC ANG